# Patient Record
Sex: MALE | Race: WHITE | NOT HISPANIC OR LATINO | Employment: OTHER | URBAN - METROPOLITAN AREA
[De-identification: names, ages, dates, MRNs, and addresses within clinical notes are randomized per-mention and may not be internally consistent; named-entity substitution may affect disease eponyms.]

---

## 2017-09-01 ENCOUNTER — HOSPITAL ENCOUNTER (OUTPATIENT)
Facility: MEDICAL CENTER | Age: 82
End: 2017-09-01

## 2017-09-01 ENCOUNTER — RESOLUTE PROFESSIONAL BILLING HOSPITAL PROF FEE (OUTPATIENT)
Dept: HOSPITALIST | Facility: MEDICAL CENTER | Age: 82
End: 2017-09-01

## 2017-09-01 NOTE — PROGRESS NOTES
Patient is a direct admit from [facility], patient of Dr. Cruz's for renal failure.  Dr. Coreas is accepting the patient.  Telephone ADT order received and entered into epic on 9/1.  Held orders in epic to be released upon patients arrival to unit.

## 2017-09-02 ENCOUNTER — HOSPITAL ENCOUNTER (INPATIENT)
Facility: MEDICAL CENTER | Age: 82
LOS: 12 days | DRG: 682 | End: 2017-09-14
Attending: HOSPITALIST | Admitting: INTERNAL MEDICINE
Payer: MEDICARE

## 2017-09-02 ENCOUNTER — APPOINTMENT (OUTPATIENT)
Dept: RADIOLOGY | Facility: MEDICAL CENTER | Age: 82
DRG: 682 | End: 2017-09-02
Attending: INTERNAL MEDICINE
Payer: MEDICARE

## 2017-09-02 DIAGNOSIS — N17.9 ACUTE RENAL FAILURE, UNSPECIFIED ACUTE RENAL FAILURE TYPE (HCC): ICD-10-CM

## 2017-09-02 PROBLEM — J18.9 PNEUMONIA: Status: ACTIVE | Noted: 2017-09-02

## 2017-09-02 PROBLEM — L89.150 UNSTAGEABLE PRESSURE ULCER OF SACRAL REGION (HCC): Status: ACTIVE | Noted: 2017-09-02

## 2017-09-02 PROBLEM — R79.89 TROPONIN LEVEL ELEVATED: Status: ACTIVE | Noted: 2017-09-02

## 2017-09-02 PROBLEM — I10 HYPERTENSION: Status: ACTIVE | Noted: 2017-09-02

## 2017-09-02 PROBLEM — H91.90 HOH (HARD OF HEARING): Status: ACTIVE | Noted: 2017-09-02

## 2017-09-02 PROBLEM — R60.9 EDEMA: Status: ACTIVE | Noted: 2017-09-02

## 2017-09-02 PROBLEM — D64.9 ANEMIA: Status: ACTIVE | Noted: 2017-09-02

## 2017-09-02 PROBLEM — E83.51 HYPOCALCEMIA: Status: ACTIVE | Noted: 2017-09-02

## 2017-09-02 PROBLEM — S37.009A KIDNEY INJURY: Status: ACTIVE | Noted: 2017-09-02

## 2017-09-02 PROBLEM — R33.9 URINARY RETENTION: Status: ACTIVE | Noted: 2017-09-02

## 2017-09-02 PROBLEM — R05.9 COUGH: Status: ACTIVE | Noted: 2017-09-02

## 2017-09-02 PROBLEM — R79.89 ELEVATED BRAIN NATRIURETIC PEPTIDE (BNP) LEVEL: Status: ACTIVE | Noted: 2017-09-02

## 2017-09-02 PROBLEM — E43 SEVERE PROTEIN-CALORIE MALNUTRITION (HCC): Status: ACTIVE | Noted: 2017-09-02

## 2017-09-02 LAB
ALBUMIN SERPL BCP-MCNC: 1.6 G/DL (ref 3.2–4.9)
ALBUMIN/GLOB SERPL: 0.5 G/DL
ALP SERPL-CCNC: 162 U/L (ref 30–99)
ALT SERPL-CCNC: 14 U/L (ref 2–50)
ANION GAP SERPL CALC-SCNC: 15 MMOL/L (ref 0–11.9)
APPEARANCE UR: ABNORMAL
AST SERPL-CCNC: 22 U/L (ref 12–45)
BACTERIA #/AREA URNS HPF: ABNORMAL /HPF
BASOPHILS # BLD AUTO: 0.2 % (ref 0–1.8)
BASOPHILS # BLD: 0.01 K/UL (ref 0–0.12)
BILIRUB SERPL-MCNC: 0.4 MG/DL (ref 0.1–1.5)
BILIRUB UR QL STRIP.AUTO: NEGATIVE
BNP SERPL-MCNC: 2871 PG/ML (ref 0–100)
BNP SERPL-MCNC: 2882 PG/ML (ref 0–100)
BUN SERPL-MCNC: 130 MG/DL (ref 8–22)
CALCIUM SERPL-MCNC: 6.3 MG/DL (ref 8.5–10.5)
CHLORIDE SERPL-SCNC: 110 MMOL/L (ref 96–112)
CHLORIDE UR-SCNC: 111 MMOL/L
CO2 SERPL-SCNC: 12 MMOL/L (ref 20–33)
COLOR UR: YELLOW
CREAT SERPL-MCNC: 7.39 MG/DL (ref 0.5–1.4)
CREAT UR-MCNC: 37 MG/DL
EOSINOPHIL # BLD AUTO: 0.02 K/UL (ref 0–0.51)
EOSINOPHIL NFR BLD: 0.3 % (ref 0–6.9)
EPI CELLS #/AREA URNS HPF: NEGATIVE /HPF
ERYTHROCYTE [DISTWIDTH] IN BLOOD BY AUTOMATED COUNT: 50.7 FL (ref 35.9–50)
FERRITIN SERPL-MCNC: 1105.6 NG/ML (ref 22–322)
FOLATE SERPL-MCNC: 6.6 NG/ML
GFR SERPL CREATININE-BSD FRML MDRD: 7 ML/MIN/1.73 M 2
GLOBULIN SER CALC-MCNC: 3.3 G/DL (ref 1.9–3.5)
GLUCOSE SERPL-MCNC: 93 MG/DL (ref 65–99)
GLUCOSE UR STRIP.AUTO-MCNC: NEGATIVE MG/DL
HCT VFR BLD AUTO: 31 % (ref 42–52)
HGB BLD-MCNC: 10.2 G/DL (ref 14–18)
HGB RETIC QN AUTO: 34.9 PG/CELL (ref 29–35)
HYALINE CASTS #/AREA URNS LPF: ABNORMAL /LPF
IMM GRANULOCYTES # BLD AUTO: 0.04 K/UL (ref 0–0.11)
IMM GRANULOCYTES NFR BLD AUTO: 0.7 % (ref 0–0.9)
IMM RETICS NFR: 9.2 % (ref 9.3–17.4)
IRON SATN MFR SERPL: 26 % (ref 15–55)
IRON SERPL-MCNC: 34 UG/DL (ref 50–180)
KETONES UR STRIP.AUTO-MCNC: NEGATIVE MG/DL
LEUKOCYTE ESTERASE UR QL STRIP.AUTO: ABNORMAL
LV EJECT FRACT  99904: 55
LV EJECT FRACT MOD 2C 99903: 50.28
LV EJECT FRACT MOD 4C 99902: 61.28
LV EJECT FRACT MOD BP 99901: 57.43
LYMPHOCYTES # BLD AUTO: 0.57 K/UL (ref 1–4.8)
LYMPHOCYTES NFR BLD: 9.3 % (ref 22–41)
MCH RBC QN AUTO: 29.2 PG (ref 27–33)
MCHC RBC AUTO-ENTMCNC: 32.9 G/DL (ref 33.7–35.3)
MCV RBC AUTO: 88.8 FL (ref 81.4–97.8)
MICRO URNS: ABNORMAL
MONOCYTES # BLD AUTO: 0.55 K/UL (ref 0–0.85)
MONOCYTES NFR BLD AUTO: 9 % (ref 0–13.4)
NEUTROPHILS # BLD AUTO: 4.91 K/UL (ref 1.82–7.42)
NEUTROPHILS NFR BLD: 80.5 % (ref 44–72)
NITRITE UR QL STRIP.AUTO: NEGATIVE
NRBC # BLD AUTO: 0 K/UL
NRBC BLD AUTO-RTO: 0 /100 WBC
PH UR STRIP.AUTO: 5 [PH]
PLATELET # BLD AUTO: 189 K/UL (ref 164–446)
PMV BLD AUTO: 11.8 FL (ref 9–12.9)
POTASSIUM SERPL-SCNC: 4.6 MMOL/L (ref 3.6–5.5)
POTASSIUM UR-SCNC: 25.6 MMOL/L
PROCALCITONIN SERPL-MCNC: 0.65 NG/ML
PROT SERPL-MCNC: 4.9 G/DL (ref 6–8.2)
PROT UR QL STRIP: 30 MG/DL
PROT UR-MCNC: 66.6 MG/DL (ref 0–15)
RBC # BLD AUTO: 3.49 M/UL (ref 4.7–6.1)
RBC # URNS HPF: ABNORMAL /HPF
RBC UR QL AUTO: ABNORMAL
RETICS # AUTO: 0.02 M/UL (ref 0.04–0.06)
RETICS/RBC NFR: 0.6 % (ref 0.8–2.1)
SODIUM SERPL-SCNC: 137 MMOL/L (ref 135–145)
SODIUM UR-SCNC: 98 MMOL/L
SP GR UR STRIP.AUTO: 1.01
TIBC SERPL-MCNC: 133 UG/DL (ref 250–450)
TROPONIN I SERPL-MCNC: 0.2 NG/ML (ref 0–0.04)
UROBILINOGEN UR STRIP.AUTO-MCNC: 0.2 MG/DL
VIT B12 SERPL-MCNC: 721 PG/ML (ref 211–911)
WBC # BLD AUTO: 6.1 K/UL (ref 4.8–10.8)
WBC #/AREA URNS HPF: ABNORMAL /HPF

## 2017-09-02 PROCEDURE — 84156 ASSAY OF PROTEIN URINE: CPT

## 2017-09-02 PROCEDURE — 84484 ASSAY OF TROPONIN QUANT: CPT

## 2017-09-02 PROCEDURE — 83540 ASSAY OF IRON: CPT

## 2017-09-02 PROCEDURE — 99221 1ST HOSP IP/OBS SF/LOW 40: CPT | Performed by: INTERNAL MEDICINE

## 2017-09-02 PROCEDURE — 80053 COMPREHEN METABOLIC PANEL: CPT

## 2017-09-02 PROCEDURE — 82570 ASSAY OF URINE CREATININE: CPT

## 2017-09-02 PROCEDURE — 82728 ASSAY OF FERRITIN: CPT

## 2017-09-02 PROCEDURE — 36415 COLL VENOUS BLD VENIPUNCTURE: CPT

## 2017-09-02 PROCEDURE — 76775 US EXAM ABDO BACK WALL LIM: CPT

## 2017-09-02 PROCEDURE — 85025 COMPLETE CBC W/AUTO DIFF WBC: CPT

## 2017-09-02 PROCEDURE — 85046 RETICYTE/HGB CONCENTRATE: CPT

## 2017-09-02 PROCEDURE — 302112 WASHCLOTH,PERINEAL CARE: Performed by: HOSPITALIST

## 2017-09-02 PROCEDURE — 700111 HCHG RX REV CODE 636 W/ 250 OVERRIDE (IP): Performed by: INTERNAL MEDICINE

## 2017-09-02 PROCEDURE — 82607 VITAMIN B-12: CPT

## 2017-09-02 PROCEDURE — 90471 IMMUNIZATION ADMIN: CPT

## 2017-09-02 PROCEDURE — 700101 HCHG RX REV CODE 250: Performed by: INTERNAL MEDICINE

## 2017-09-02 PROCEDURE — 700105 HCHG RX REV CODE 258: Performed by: INTERNAL MEDICINE

## 2017-09-02 PROCEDURE — 84300 ASSAY OF URINE SODIUM: CPT

## 2017-09-02 PROCEDURE — 84145 PROCALCITONIN (PCT): CPT

## 2017-09-02 PROCEDURE — 700111 HCHG RX REV CODE 636 W/ 250 OVERRIDE (IP): Performed by: HOSPITALIST

## 2017-09-02 PROCEDURE — 81001 URINALYSIS AUTO W/SCOPE: CPT

## 2017-09-02 PROCEDURE — 770020 HCHG ROOM/CARE - TELE (206)

## 2017-09-02 PROCEDURE — 93306 TTE W/DOPPLER COMPLETE: CPT

## 2017-09-02 PROCEDURE — 51798 US URINE CAPACITY MEASURE: CPT

## 2017-09-02 PROCEDURE — 90662 IIV NO PRSV INCREASED AG IM: CPT | Performed by: HOSPITALIST

## 2017-09-02 PROCEDURE — 3E0234Z INTRODUCTION OF SERUM, TOXOID AND VACCINE INTO MUSCLE, PERCUTANEOUS APPROACH: ICD-10-PCS | Performed by: HOSPITALIST

## 2017-09-02 PROCEDURE — 82746 ASSAY OF FOLIC ACID SERUM: CPT

## 2017-09-02 PROCEDURE — 93306 TTE W/DOPPLER COMPLETE: CPT | Mod: 26 | Performed by: INTERNAL MEDICINE

## 2017-09-02 PROCEDURE — 71010 DX-CHEST-PORTABLE (1 VIEW): CPT

## 2017-09-02 PROCEDURE — 84133 ASSAY OF URINE POTASSIUM: CPT

## 2017-09-02 PROCEDURE — 83880 ASSAY OF NATRIURETIC PEPTIDE: CPT

## 2017-09-02 PROCEDURE — 302255 BARRIER CREAM MOISTURE BAZA PROTECT (ZINC) 5OZ: Performed by: HOSPITALIST

## 2017-09-02 PROCEDURE — 82436 ASSAY OF URINE CHLORIDE: CPT

## 2017-09-02 PROCEDURE — 83550 IRON BINDING TEST: CPT

## 2017-09-02 PROCEDURE — 71250 CT THORAX DX C-: CPT

## 2017-09-02 RX ORDER — LABETALOL HYDROCHLORIDE 5 MG/ML
10 INJECTION, SOLUTION INTRAVENOUS EVERY 4 HOURS PRN
Status: DISCONTINUED | OUTPATIENT
Start: 2017-09-02 | End: 2017-09-04

## 2017-09-02 RX ORDER — BISACODYL 10 MG
10 SUPPOSITORY, RECTAL RECTAL
Status: DISCONTINUED | OUTPATIENT
Start: 2017-09-02 | End: 2017-09-04

## 2017-09-02 RX ORDER — ATENOLOL 50 MG/1
50 TABLET ORAL DAILY
Status: ON HOLD | COMMUNITY
End: 2017-09-13

## 2017-09-02 RX ORDER — ACETAMINOPHEN 325 MG/1
650 TABLET ORAL EVERY 6 HOURS PRN
Status: DISCONTINUED | OUTPATIENT
Start: 2017-09-02 | End: 2017-09-14 | Stop reason: HOSPADM

## 2017-09-02 RX ORDER — POLYETHYLENE GLYCOL 3350 17 G/17G
1 POWDER, FOR SOLUTION ORAL
Status: DISCONTINUED | OUTPATIENT
Start: 2017-09-02 | End: 2017-09-04

## 2017-09-02 RX ORDER — FUROSEMIDE 10 MG/ML
80 INJECTION INTRAMUSCULAR; INTRAVENOUS
Status: DISCONTINUED | OUTPATIENT
Start: 2017-09-02 | End: 2017-09-04

## 2017-09-02 RX ORDER — AMOXICILLIN 250 MG
2 CAPSULE ORAL 2 TIMES DAILY
Status: DISCONTINUED | OUTPATIENT
Start: 2017-09-02 | End: 2017-09-04

## 2017-09-02 RX ORDER — HEPARIN SODIUM 5000 [USP'U]/ML
5000 INJECTION, SOLUTION INTRAVENOUS; SUBCUTANEOUS EVERY 8 HOURS
Status: DISCONTINUED | OUTPATIENT
Start: 2017-09-02 | End: 2017-09-04

## 2017-09-02 RX ORDER — AMLODIPINE BESYLATE 5 MG/1
5 TABLET ORAL DAILY
Status: ON HOLD | COMMUNITY
End: 2017-09-13

## 2017-09-02 RX ORDER — ONDANSETRON 2 MG/ML
4 INJECTION INTRAMUSCULAR; INTRAVENOUS EVERY 4 HOURS PRN
Status: DISCONTINUED | OUTPATIENT
Start: 2017-09-02 | End: 2017-09-14 | Stop reason: HOSPADM

## 2017-09-02 RX ORDER — ONDANSETRON 4 MG/1
4 TABLET, ORALLY DISINTEGRATING ORAL EVERY 4 HOURS PRN
Status: DISCONTINUED | OUTPATIENT
Start: 2017-09-02 | End: 2017-09-14 | Stop reason: HOSPADM

## 2017-09-02 RX ORDER — AMLODIPINE BESYLATE 5 MG/1
5 TABLET ORAL DAILY
Status: DISCONTINUED | OUTPATIENT
Start: 2017-09-02 | End: 2017-09-04

## 2017-09-02 RX ORDER — ATENOLOL 50 MG/1
50 TABLET ORAL DAILY
Status: DISCONTINUED | OUTPATIENT
Start: 2017-09-02 | End: 2017-09-04

## 2017-09-02 RX ORDER — TAMSULOSIN HYDROCHLORIDE 0.4 MG/1
0.4 CAPSULE ORAL
Status: DISCONTINUED | OUTPATIENT
Start: 2017-09-02 | End: 2017-09-04

## 2017-09-02 RX ORDER — FUROSEMIDE 10 MG/ML
20 INJECTION INTRAMUSCULAR; INTRAVENOUS
Status: DISCONTINUED | OUTPATIENT
Start: 2017-09-02 | End: 2017-09-02

## 2017-09-02 RX ADMIN — FUROSEMIDE 80 MG: 10 INJECTION, SOLUTION INTRAMUSCULAR; INTRAVENOUS at 14:20

## 2017-09-02 RX ADMIN — AMPICILLIN SODIUM AND SULBACTAM SODIUM 3 G: 2; 1 INJECTION, POWDER, FOR SOLUTION INTRAMUSCULAR; INTRAVENOUS at 11:43

## 2017-09-02 RX ADMIN — INFLUENZA A VIRUSA/MICHIGAN/45/2015 X-275 (H1N1) ANTIGEN (FORMALDEHYDE INACTIVATED), INFLUENZA A VIRUS A/HONG KONG/4801/2014 X-263B (H3N2) ANTIGEN (FORMALDEHYDE INACTIVATED), AND INFLUENZA B VIRUS B/BRISBANE/60/2008 ANTIGEN (FORMALDEHYDE INACTIVATED) 0.5 ML: 60; 60; 60 INJECTION, SUSPENSION INTRAMUSCULAR at 05:42

## 2017-09-02 RX ADMIN — HEPARIN SODIUM 5000 UNITS: 5000 INJECTION, SOLUTION INTRAVENOUS; SUBCUTANEOUS at 22:06

## 2017-09-02 RX ADMIN — DOXYCYCLINE 100 MG: 100 INJECTION, POWDER, LYOPHILIZED, FOR SOLUTION INTRAVENOUS at 22:06

## 2017-09-02 RX ADMIN — DOXYCYCLINE 100 MG: 100 INJECTION, POWDER, LYOPHILIZED, FOR SOLUTION INTRAVENOUS at 12:34

## 2017-09-02 RX ADMIN — HEPARIN SODIUM 5000 UNITS: 5000 INJECTION, SOLUTION INTRAVENOUS; SUBCUTANEOUS at 14:16

## 2017-09-02 RX ADMIN — HEPARIN SODIUM 5000 UNITS: 5000 INJECTION, SOLUTION INTRAVENOUS; SUBCUTANEOUS at 05:42

## 2017-09-02 ASSESSMENT — PATIENT HEALTH QUESTIONNAIRE - PHQ9
7. TROUBLE CONCENTRATING ON THINGS, SUCH AS READING THE NEWSPAPER OR WATCHING TELEVISION: NOT AT ALL
SUM OF ALL RESPONSES TO PHQ QUESTIONS 1-9: 1
1. LITTLE INTEREST OR PLEASURE IN DOING THINGS: NOT AT ALL
SUM OF ALL RESPONSES TO PHQ9 QUESTIONS 1 AND 2: 1
4. FEELING TIRED OR HAVING LITTLE ENERGY: NOT AT ALL
6. FEELING BAD ABOUT YOURSELF - OR THAT YOU ARE A FAILURE OR HAVE LET YOURSELF OR YOUR FAMILY DOWN: NOT AL ALL
8. MOVING OR SPEAKING SO SLOWLY THAT OTHER PEOPLE COULD HAVE NOTICED. OR THE OPPOSITE, BEING SO FIGETY OR RESTLESS THAT YOU HAVE BEEN MOVING AROUND A LOT MORE THAN USUAL: NOT AT ALL
2. FEELING DOWN, DEPRESSED, IRRITABLE, OR HOPELESS: SEVERAL DAYS
5. POOR APPETITE OR OVEREATING: NOT AT ALL
3. TROUBLE FALLING OR STAYING ASLEEP OR SLEEPING TOO MUCH: NOT AT ALL
9. THOUGHTS THAT YOU WOULD BE BETTER OFF DEAD, OR OF HURTING YOURSELF: NOT AT ALL

## 2017-09-02 ASSESSMENT — COPD QUESTIONNAIRES
DURING THE PAST 4 WEEKS HOW MUCH DID YOU FEEL SHORT OF BREATH: NONE/LITTLE OF THE TIME
DO YOU EVER COUGH UP ANY MUCUS OR PHLEGM?: NO/ONLY WITH OCCASIONAL COLDS OR INFECTIONS
COPD SCREENING SCORE: 2
HAVE YOU SMOKED AT LEAST 100 CIGARETTES IN YOUR ENTIRE LIFE: NO/DON'T KNOW

## 2017-09-02 ASSESSMENT — PAIN SCALES - GENERAL
PAINLEVEL_OUTOF10: 0

## 2017-09-02 ASSESSMENT — LIFESTYLE VARIABLES
EVER_SMOKED: NEVER
EVER FELT BAD OR GUILTY ABOUT YOUR DRINKING: NO
TOTAL SCORE: 0
EVER_SMOKED: NEVER
ON A TYPICAL DAY WHEN YOU DRINK ALCOHOL HOW MANY DRINKS DO YOU HAVE: 1
EVER HAD A DRINK FIRST THING IN THE MORNING TO STEADY YOUR NERVES TO GET RID OF A HANGOVER: NO
TOTAL SCORE: 0
CONSUMPTION TOTAL: NEGATIVE
HAVE PEOPLE ANNOYED YOU BY CRITICIZING YOUR DRINKING: NO
AVERAGE NUMBER OF DAYS PER WEEK YOU HAVE A DRINK CONTAINING ALCOHOL: 6
TOTAL SCORE: 0
HOW MANY TIMES IN THE PAST YEAR HAVE YOU HAD 5 OR MORE DRINKS IN A DAY: 0
HAVE YOU EVER FELT YOU SHOULD CUT DOWN ON YOUR DRINKING: NO
ALCOHOL_USE: YES

## 2017-09-02 ASSESSMENT — ENCOUNTER SYMPTOMS
ABDOMINAL PAIN: 0
SPEECH CHANGE: 0
HEMOPTYSIS: 0
CHILLS: 0
CONSTIPATION: 0
SENSORY CHANGE: 0
DIARRHEA: 0
FEVER: 0
BLURRED VISION: 0
HEADACHES: 0
WEAKNESS: 0
SPUTUM PRODUCTION: 0
VOMITING: 0
HEARTBURN: 0
MYALGIAS: 0
INSOMNIA: 0
PHOTOPHOBIA: 0
COUGH: 1
SHORTNESS OF BREATH: 0
NERVOUS/ANXIOUS: 0
DEPRESSION: 0
CLAUDICATION: 0
DIZZINESS: 0

## 2017-09-02 ASSESSMENT — COGNITIVE AND FUNCTIONAL STATUS - GENERAL
WALKING IN HOSPITAL ROOM: A LOT
MOVING TO AND FROM BED TO CHAIR: A LOT
EATING MEALS: A LITTLE
MOBILITY SCORE: 12
DRESSING REGULAR LOWER BODY CLOTHING: A LOT
HELP NEEDED FOR BATHING: A LOT
DRESSING REGULAR UPPER BODY CLOTHING: A LOT
TOILETING: A LOT
MOVING FROM LYING ON BACK TO SITTING ON SIDE OF FLAT BED: A LOT
PERSONAL GROOMING: A LOT
DAILY ACTIVITIY SCORE: 13
TURNING FROM BACK TO SIDE WHILE IN FLAT BAD: A LITTLE
SUGGESTED CMS G CODE MODIFIER DAILY ACTIVITY: CL
SUGGESTED CMS G CODE MODIFIER MOBILITY: CL
STANDING UP FROM CHAIR USING ARMS: A LOT
CLIMB 3 TO 5 STEPS WITH RAILING: TOTAL

## 2017-09-02 NOTE — PROGRESS NOTES
Pt in bed sleeping. Pt has audible wheezes. No signs pain or distress at this time. Call light in place and bed in lowest position.

## 2017-09-02 NOTE — PROGRESS NOTES
Notified by monitor room patient had three beats idoventricular. MD notified. MD at bedside to assess patient.

## 2017-09-02 NOTE — PROGRESS NOTES
Pt arrived to unit via gurney at 0100 from care flight. Pt oriented to room, unit, and plan of care. Tele-monitor placed and monitor room notified. All questions answered at this time. Call light within reach; fall precautions in place.

## 2017-09-02 NOTE — ASSESSMENT & PLAN NOTE
- due to anemia of renal disease/chronic disease with low iron and high ferritin.   - monitor Hgb daily, transfuse for Hgb<7.

## 2017-09-02 NOTE — ASSESSMENT & PLAN NOTE
- well controlled.   - continue norvasc and atenolol. Continue to monitor BP. Continue PRN IV labetalol for significant HTN.

## 2017-09-02 NOTE — PROGRESS NOTES
Pt in bed resting. No signs of pain or distress noted. Urine sample collected. Urine pale and clear with no particles. Call light in place and bed in lowest position

## 2017-09-02 NOTE — ASSESSMENT & PLAN NOTE
- continue unasyn, and doxycycline. Continue to trend procalcitonin. Send for sputum cultures if with phlegm.  - maintain NPO. Insert cortrak, and start tube feeding per SLP.

## 2017-09-02 NOTE — CARE PLAN
Problem: Safety  Goal: Will remain free from falls    Intervention: Assess risk factors for falls  Fall precautions in place. Bed in lowest position. Non-skid socks in place. Personal possessions within reach. Mobility sign on door. Bed-alarm on. Call light within reach. Pt educated regarding fall prevention and states understanding.       Problem: Knowledge Deficit  Goal: Knowledge of disease process/condition, treatment plan, diagnostic tests, and medications will improve    Intervention: Assess knowledge level of disease process/condition, treatment plan, diagnostic tests, and medications  Pt educated regarding plan of care and medications. All questions answered.

## 2017-09-02 NOTE — PROGRESS NOTES
Savita Bo Fall Risk Assessment:     Last Known Fall: Within the last six months  Mobility: Use of assistive device/requires assist of two people  Medications: Cardiovascular or central nervous system meds  Mental Status/LOC/Awareness: Awake, alert, and oriented to date, place, and person  Toileting Needs: Use of catheters or diversion devices  Volume/Electrolyte Status: No problems  Communication/Sensory: Visual (Glasses)/hearing deficit  Behavior: Appropriate behavior  Savita Bo Fall Risk Total: 11  Fall Risk Level: MODERATE RISK    Universal Fall Precautions:  call light/belongings in reach, bed in low position and locked, wheelchairs and assistive devices out of sight, siderails up x 2, use non-slip footwear, adequate lighting, clutter free and spill free environment, educate to call for assistance, educate on level of risk    Fall Risk Level Interventions:    TRIAL (TELE 8, NEURO, MED MARIMAR 5) Moderate Fall Risk Interventions  Place yellow fall risk ID band on patient: completed  Provide patient/family education based on risk assessment : completed  Educate patient/family to call staff for assistance when getting out of bed: completed  Place fall precaution signage outside patient door: completed  Utilize bed/chair fall alarm: completed     Patient Specific Interventions:     Medication: limit combination of prn medications  Mental Status/LOC/Awareness: reinforce falls education, check on patient hourly, utilize bed/chair fall alarm and reinforce the use of call light  Toileting: provide frquent toileting and instruct patient/family on the need to call for assistance when toileting  Volume/Electrolyte Status: monitor abnormal lab values  Communication/Sensory: update plan of care on whiteboard, ensure proper positioning when transferrng/ambulating and ensure patient has glasses/contacts and hearing aids/dentures  Behavioral: encourage patient to voice feelings, administer medication as ordered and  instruct/reinforce fall program rationale  Mobility: provide comfort measures during transport, utilize bed/chair fall alarm, ensure bed is locked and in lowest position and provide appropriate assistive device

## 2017-09-02 NOTE — H&P
Hospital Medicine History and Physical    Date of Service  9/2/2017    Chief Complaint  No chief complaint on file.      History of Presenting Illness  89 y.o. male who presented 9/2/2017 as a transfer from [facility] with continued elevation in his Cr despite interventions with IVF.  He has been producing urine but with infrequency.  His presenting complaint to outside facility was of BLE edema.  He was given IV hydration and cr went from 7.29 to 6.0 but he developed respiratory failure and worsening chronic cough and then was diuresed.  CR up to 6.75 today which prompted transfer for higher level of care.  Patient states he does feel better and the leg swelling he had has since resolved and MITZI hose are in place.  He also has un-stagable ulcer on the sacrum on presentation.  Patient states he has a chronic cough but no other complaints.  Will order CXR, Echo, Renal US for update on status.    Primary Care Physician  No primary care provider on file.    Consultants  None currently  Consider nephrology consultation    Code Status  DNR    Review of Systems  Review of Systems   Constitutional: Negative for chills and fever.   HENT: Negative for congestion.    Eyes: Negative for blurred vision and photophobia.   Respiratory: Positive for cough (chronic). Negative for hemoptysis, sputum production and shortness of breath.    Cardiovascular: Negative for chest pain, claudication and leg swelling.   Gastrointestinal: Negative for abdominal pain, constipation, diarrhea, heartburn and vomiting.   Genitourinary: Negative for dysuria and hematuria.   Musculoskeletal: Negative for joint pain and myalgias.   Skin: Negative for itching and rash.   Neurological: Negative for dizziness, sensory change, speech change, weakness and headaches.   Psychiatric/Behavioral: Negative for depression. The patient is not nervous/anxious and does not have insomnia.         Past Medical History  No past medical history on  file.  Hypertension    Surgical History  No past surgical history on file.  none  Medications  No current facility-administered medications on file prior to encounter.      No current outpatient prescriptions on file prior to encounter.   atenolol 50 mg qday  Amlodipine 5mg qday    Family History  No family history on file.    Social History  Social History   Substance Use Topics   • Smoking status: Not on file   • Smokeless tobacco: Not on file   • Alcohol use Not on file       Allergies  Allergies no known allergies     Physical Exam  Laboratory   Hemodynamics  Temp (24hrs), Av.5 °C (97.7 °F), Min:36.5 °C (97.7 °F), Max:36.5 °C (97.7 °F)   Temperature: 36.5 °C (97.7 °F)  Pulse  Av  Min: 76  Max: 76    Blood Pressure : 112/63      Respiratory      Respiration: 18, Pulse Oximetry: 93 %             Physical Exam   Constitutional: He is oriented to person, place, and time. He appears well-developed and well-nourished. No distress.   HENT:   Head: Normocephalic and atraumatic.   Eyes: Conjunctivae are normal. No scleral icterus.   Neck: Neck supple. No JVD present.   Cardiovascular: Normal rate and regular rhythm.  Exam reveals no gallop and no friction rub.    No murmur heard.  Pulmonary/Chest: Effort normal and breath sounds normal. No respiratory distress. He has no wheezes. He exhibits no tenderness.   Abdominal: Soft. Bowel sounds are normal. He exhibits no distension and no mass. There is no tenderness.   Musculoskeletal: He exhibits no edema or tenderness.   Neurological: He is alert and oriented to person, place, and time. No cranial nerve deficit.   Skin: Skin is warm and dry. He is not diaphoretic. No erythema. No pallor.   Un-stagable pressure ulcer sacrum     Psychiatric: He has a normal mood and affect. His behavior is normal.   Nursing note and vitals reviewed.              No results for input(s): ALTSGPT, ASTSGOT, ALKPHOSPHAT, TBILIRUBIN, DBILIRUBIN, GAMMAGT, AMYLASE, LIPASE, ALB, PREALBUMIN,  "GLUCOSE in the last 72 hours.              No results found for: TROPONINI  Urinalysis:  No results found for: SPECGRAVITY, GLUCOSEUR, KETONES, NITRITE, WBCURINE, RBCURINE, BACTERIA, EPITHELCELL     Imaging  cxr from outside facility 9/1/17:  Bilateral alveolar process present     Assessment/Plan     I anticipate this patient will require at least two midnights for appropriate medical management, necessitating inpatient admission.    Troponin level elevated   Assessment & Plan    Troponin documented 0.231 on admission at outside hospital - likely related to renal failure rather than true cardiac ischemia  Recheck level          Cough   Assessment & Plan    Patient states chronic issue  CXR pending - \"bilateral alveolar process present\" documented on xr report from this am          Edema   Assessment & Plan    Patient's complaint on admission to outside facility   MITZI hose in place from transfer, no presence of LE edema now          Hypertension   Assessment & Plan    Was on amlodipine/atenolol prior to admission  Patient denied recent f/u with physicians.          Elevated brain natriuretic peptide (BNP) level   Assessment & Plan    Clinically did not present with chf  Level documented at 4581 (not considered elevated unless >900 based on outlying facility scale)  No EF or echo in records sent from outside facility  Check Echo  Continue with tele monitoring          Kidney injury   Assessment & Plan    Presented to outside facility with cr 6.75 up to 7.29, down to 6.0 and then back up again today  Renal US from 7/2017 unremarkable other than a cyst on right kidney  He was hydrated at outlying facility and developed respiratory failure following this he did respond to diuresis and is in on room air  Consider nephrology consult in am  Renal US and FeNa ordered              VTE prophylaxis: heparin.       "

## 2017-09-02 NOTE — PROGRESS NOTES
2 RN skin check completed with Jv HORTON. Elbows and heels red, blanching. Sacrum with moisture fissure above gluteal cleft and non-blanching area inferior to the coccyx on left buttock. Small scabs on legs, b/l.  No other skin breakdown noted.

## 2017-09-02 NOTE — ASSESSMENT & PLAN NOTE
Patient's complaint on admission to outside facility   MITZI hose in place from transfer, no presence of LE edema now

## 2017-09-02 NOTE — ASSESSMENT & PLAN NOTE
- likely demand ischemia from ALBERTO. No CP or symptoms. No regional hypokinesia on echo.   - continue to monitor on telemetry.

## 2017-09-02 NOTE — PROGRESS NOTES
Pt in chair. No signs of pain or distress noted at this time. Call light in place and bed in lowest position. Will continue to monitor

## 2017-09-02 NOTE — PROGRESS NOTES
Received report from nightshift RN, assumed care of patient. Patient is A&O x 3, disoriented to date, bed alarm on. Patient educated on importance of calling if in need of assistance. Verbalizes understanding. Patient declines pain at this time. Patient updated on plan of care, voices no concerns at this time. Will continue to monitor for safety and comfort.

## 2017-09-02 NOTE — ASSESSMENT & PLAN NOTE
"Patient states chronic issue  CXR pending - \"bilateral alveolar process present\" documented on xr report from this am    "

## 2017-09-02 NOTE — ASSESSMENT & PLAN NOTE
- unclear cause, suspect CKD/ESRD. No hydronephrosis on US.   - continue saavedra, monitor UO.   - continue IV lasix for now. Close UO monitoring.   - continue to avoid nephrotoxins. Renally dose medications.   - Nephrology following. May need dialysis eventually but pt lives in an area without outpatient dialysis center.

## 2017-09-02 NOTE — PROGRESS NOTES
[facility] Hospitalist Progress Note    Date of Service: 2017    Chief Complaint  89/M with HTN, transferred from [facility] for continued creatinine elevation (crea 7.29) despite IVF, causing respiratory failure and worsening of chronic cough, responding to diuretics. (+) unstageable sacral ulcer. Trop elevated at 0.23, felt to be due to ALBERTO. Has been retaining urine there and saavedra placed.      Interval Problem Update  2017 - no overnight events. Remains hemodynamically stable and afebrile. Stable on RA.  No leukocytosis. Hgb 10.2. Crea 7.39. Na, K WNL. Trop down to 0.20. BNP 2871. Albumin 1.6. Pending echo and renal US. CXR showed multifocal consolidation (personally reviewed).     > Seen and examined. Comfortable. Denied any CP, SOB, nausea, vomiting, abd pain. AOx3. (+) coughing with PO intake. States he lives alone.     Consultants/Specialty  Nephrology    Disposition  Monitor on telemetry. PT/OT.       ROS     Pertinent positives/negatives as mentioned above.     A complete review of systems was done. All other systems were negative.      Physical Exam  Laboratory/Imaging   Hemodynamics  Temp (24hrs), Av.4 °C (97.6 °F), Min:36.3 °C (97.4 °F), Max:36.5 °C (97.7 °F)   Temperature: 36.3 °C (97.4 °F)  Pulse  Av.7  Min: 70  Max: 76    Blood Pressure : 110/57      Respiratory      Respiration: 20, Pulse Oximetry: 93 %        RUL Breath Sounds: Clear, RML Breath Sounds: Diminished;Crackles, RLL Breath Sounds: Diminished, INGRIS Breath Sounds: Clear, LLL Breath Sounds: Diminished    Fluids    Intake/Output Summary (Last 24 hours) at 17 1232  Last data filed at 17 0600   Gross per 24 hour   Intake                0 ml   Output              300 ml   Net             -300 ml       Nutrition  Orders Placed This Encounter   Procedures   • DIET NPO     Standing Status:   Standing     Number of Occurrences:   1     Order Specific Question:   Restrict to:     Answer:   Strict [1]     Physical Exam    Constitutional: He is oriented to person, place, and time. He appears well-developed.   Hard of hearing. (+) malnourished   HENT:   Head: Normocephalic and atraumatic.   Mouth/Throat: Oropharynx is clear and moist. No oropharyngeal exudate.   Eyes: EOM are normal. Pupils are equal, round, and reactive to light. Right eye exhibits no discharge. Left eye exhibits no discharge. No scleral icterus.   Neck: Normal range of motion. Neck supple. No thyromegaly present.   Cardiovascular: Normal rate and regular rhythm.  Exam reveals no gallop and no friction rub.    No murmur heard.  Pulmonary/Chest: Effort normal.   (+) crackles B/L. Diminished air entry.    Abdominal: Soft. Bowel sounds are normal. There is no tenderness. There is no rebound and no guarding.   Musculoskeletal: Normal range of motion. He exhibits edema (1-2+ B/L LE). He exhibits no tenderness.   Lymphadenopathy:     He has no cervical adenopathy.   Neurological: He is alert and oriented to person, place, and time. No cranial nerve deficit. Coordination normal.   Skin: Skin is warm and dry. No rash noted. No erythema.   Psychiatric: He has a normal mood and affect. His behavior is normal. Thought content normal.   Vitals reviewed.      Recent Labs      09/02/17   0331   WBC  6.1   RBC  3.49*   HEMOGLOBIN  10.2*   HEMATOCRIT  31.0*   MCV  88.8   MCH  29.2   MCHC  32.9*   RDW  50.7*   PLATELETCT  189   MPV  11.8     Recent Labs      09/02/17   0331   SODIUM  137   POTASSIUM  4.6   CHLORIDE  110   CO2  12*   GLUCOSE  93   BUN  130*   CREATININE  7.39*   CALCIUM  6.3*         Recent Labs      09/02/17   0331   BNPBTYPENAT  2871*              Assessment/Plan     Acute renal failure (ARF) (CMS-[facility])- (present on admission)   Assessment & Plan    - unclear cause. No response to IVF at outside hospital. ?cardiorenal failure. ?postrenal due to urinary retention.   - check UA and urine electrolytes. Await renal US to r/o hydronephrosis.   - continue saavedra, monitor  UO.   - hold off on IVF as had fluid overload at other hospital. Trial of IV lasix 80mg BID.   - avoid nephrotoxins. Renally dose medications.   - I called Dr. Najjar of nephrology for further inputs.           Anemia- (present on admission)   Assessment & Plan    - check iron panel, folate, B12, retic count.   - monitor Hgb daily, transfuse for Hgb<7.        Urinary retention- (present on admission)   Assessment & Plan    - possibly contributing to renal failure.   - continue saavedra catheter. Check renal US for obstruction.   - start PO flomax.        Hypocalcemia, corrected to low albumin- (present on admission)   Assessment & Plan    - corrected calcium at 8.2.  - monitor.         Pneumonia, community acquires, possible aspiration pneumonia- (present on admission)   Assessment & Plan    - pt coughing with PO intake.   - start empiric unasyn, and doxycycline. Check procalcitonin and trend. Will get noncontrast chest CT to further evaluate. Repeat CXR in 48 hours.  - NPO for now. Will get swallow eval per SLP.         Troponin level elevated- (present on admission)   Assessment & Plan    - likely demand ischemia from ALBERTO. No CP or symptoms.   - continue to monitor on telemetry.   - await echo.           Elevated brain natriuretic peptide (BNP) level- (present on admission)   Assessment & Plan    - Only has trace/1+ LE edema.   - check echo to evaluate ventricular function. Hold off on IVF.   - start lasix 80mg IV BID.           Severe protein-calorie malnutrition, hypoalbuminemia (CMS-HCC)- (present on admission)   Assessment & Plan    - will get RD/nutrition inputs re: supplements.         Hypertension- (present on admission)   Assessment & Plan    - continue norvasc and atenolol. Continue to trend BP. Continue PRN IV labetalol for significant HTN.              Reviewed items::  Labs reviewed, Radiology images reviewed and Medications reviewed  Saavedra catheter::  No Saavedra  DVT prophylaxis pharmacological::   Heparin  Ulcer Prophylaxis::  Not indicated  Antibiotics:  Treating active infection/contamination beyond 24 hours perioperative coverage

## 2017-09-03 ENCOUNTER — APPOINTMENT (OUTPATIENT)
Dept: RADIOLOGY | Facility: MEDICAL CENTER | Age: 82
DRG: 682 | End: 2017-09-03
Attending: INTERNAL MEDICINE
Payer: MEDICARE

## 2017-09-03 PROBLEM — R13.10 DYSPHAGIA: Status: ACTIVE | Noted: 2017-09-03

## 2017-09-03 LAB
ANION GAP SERPL CALC-SCNC: 18 MMOL/L (ref 0–11.9)
BASOPHILS # BLD AUTO: 0.2 % (ref 0–1.8)
BASOPHILS # BLD: 0.01 K/UL (ref 0–0.12)
BNP SERPL-MCNC: 2805 PG/ML (ref 0–100)
BUN SERPL-MCNC: 147 MG/DL (ref 8–22)
CALCIUM SERPL-MCNC: 6.9 MG/DL (ref 8.5–10.5)
CHLORIDE SERPL-SCNC: 110 MMOL/L (ref 96–112)
CO2 SERPL-SCNC: 14 MMOL/L (ref 20–33)
CREAT SERPL-MCNC: 8.01 MG/DL (ref 0.5–1.4)
EOSINOPHIL # BLD AUTO: 0.05 K/UL (ref 0–0.51)
EOSINOPHIL NFR BLD: 0.8 % (ref 0–6.9)
ERYTHROCYTE [DISTWIDTH] IN BLOOD BY AUTOMATED COUNT: 49.1 FL (ref 35.9–50)
GFR SERPL CREATININE-BSD FRML MDRD: 6 ML/MIN/1.73 M 2
GLUCOSE SERPL-MCNC: 84 MG/DL (ref 65–99)
HCT VFR BLD AUTO: 32 % (ref 42–52)
HGB BLD-MCNC: 10.8 G/DL (ref 14–18)
IMM GRANULOCYTES # BLD AUTO: 0.03 K/UL (ref 0–0.11)
IMM GRANULOCYTES NFR BLD AUTO: 0.5 % (ref 0–0.9)
LYMPHOCYTES # BLD AUTO: 0.6 K/UL (ref 1–4.8)
LYMPHOCYTES NFR BLD: 9.6 % (ref 22–41)
MCH RBC QN AUTO: 29 PG (ref 27–33)
MCHC RBC AUTO-ENTMCNC: 33.8 G/DL (ref 33.7–35.3)
MCV RBC AUTO: 86 FL (ref 81.4–97.8)
MONOCYTES # BLD AUTO: 0.73 K/UL (ref 0–0.85)
MONOCYTES NFR BLD AUTO: 11.7 % (ref 0–13.4)
NEUTROPHILS # BLD AUTO: 4.84 K/UL (ref 1.82–7.42)
NEUTROPHILS NFR BLD: 77.2 % (ref 44–72)
NRBC # BLD AUTO: 0 K/UL
NRBC BLD AUTO-RTO: 0 /100 WBC
PLATELET # BLD AUTO: 244 K/UL (ref 164–446)
PMV BLD AUTO: 11.2 FL (ref 9–12.9)
POTASSIUM SERPL-SCNC: 4.7 MMOL/L (ref 3.6–5.5)
PROCALCITONIN SERPL-MCNC: 0.67 NG/ML
RBC # BLD AUTO: 3.72 M/UL (ref 4.7–6.1)
SODIUM SERPL-SCNC: 142 MMOL/L (ref 135–145)
WBC # BLD AUTO: 6.3 K/UL (ref 4.8–10.8)

## 2017-09-03 PROCEDURE — 36415 COLL VENOUS BLD VENIPUNCTURE: CPT

## 2017-09-03 PROCEDURE — 92610 EVALUATE SWALLOWING FUNCTION: CPT

## 2017-09-03 PROCEDURE — 83880 ASSAY OF NATRIURETIC PEPTIDE: CPT

## 2017-09-03 PROCEDURE — 700111 HCHG RX REV CODE 636 W/ 250 OVERRIDE (IP): Performed by: INTERNAL MEDICINE

## 2017-09-03 PROCEDURE — 80048 BASIC METABOLIC PNL TOTAL CA: CPT

## 2017-09-03 PROCEDURE — 99233 SBSQ HOSP IP/OBS HIGH 50: CPT | Performed by: INTERNAL MEDICINE

## 2017-09-03 PROCEDURE — 700101 HCHG RX REV CODE 250: Performed by: INTERNAL MEDICINE

## 2017-09-03 PROCEDURE — 85025 COMPLETE CBC W/AUTO DIFF WBC: CPT

## 2017-09-03 PROCEDURE — G8996 SWALLOW CURRENT STATUS: HCPCS | Mod: CL

## 2017-09-03 PROCEDURE — G8997 SWALLOW GOAL STATUS: HCPCS | Mod: CJ

## 2017-09-03 PROCEDURE — 700105 HCHG RX REV CODE 258: Performed by: INTERNAL MEDICINE

## 2017-09-03 PROCEDURE — A9270 NON-COVERED ITEM OR SERVICE: HCPCS | Performed by: INTERNAL MEDICINE

## 2017-09-03 PROCEDURE — 84145 PROCALCITONIN (PCT): CPT

## 2017-09-03 PROCEDURE — 700102 HCHG RX REV CODE 250 W/ 637 OVERRIDE(OP): Performed by: INTERNAL MEDICINE

## 2017-09-03 PROCEDURE — 770020 HCHG ROOM/CARE - TELE (206)

## 2017-09-03 RX ADMIN — HEPARIN SODIUM 5000 UNITS: 5000 INJECTION, SOLUTION INTRAVENOUS; SUBCUTANEOUS at 20:18

## 2017-09-03 RX ADMIN — STANDARDIZED SENNA CONCENTRATE AND DOCUSATE SODIUM 2 TABLET: 8.6; 5 TABLET, FILM COATED ORAL at 20:18

## 2017-09-03 RX ADMIN — AMLODIPINE BESYLATE 5 MG: 5 TABLET ORAL at 17:53

## 2017-09-03 RX ADMIN — FUROSEMIDE 80 MG: 10 INJECTION, SOLUTION INTRAMUSCULAR; INTRAVENOUS at 16:19

## 2017-09-03 RX ADMIN — DOXYCYCLINE 100 MG: 100 INJECTION, POWDER, LYOPHILIZED, FOR SOLUTION INTRAVENOUS at 20:18

## 2017-09-03 RX ADMIN — HEPARIN SODIUM 5000 UNITS: 5000 INJECTION, SOLUTION INTRAVENOUS; SUBCUTANEOUS at 14:29

## 2017-09-03 RX ADMIN — FUROSEMIDE 80 MG: 10 INJECTION, SOLUTION INTRAMUSCULAR; INTRAVENOUS at 05:55

## 2017-09-03 RX ADMIN — DOXYCYCLINE 100 MG: 100 INJECTION, POWDER, LYOPHILIZED, FOR SOLUTION INTRAVENOUS at 09:11

## 2017-09-03 RX ADMIN — ATENOLOL 50 MG: 50 TABLET ORAL at 17:54

## 2017-09-03 RX ADMIN — AMPICILLIN SODIUM AND SULBACTAM SODIUM 3 G: 2; 1 INJECTION, POWDER, FOR SOLUTION INTRAMUSCULAR; INTRAVENOUS at 09:11

## 2017-09-03 RX ADMIN — HEPARIN SODIUM 5000 UNITS: 5000 INJECTION, SOLUTION INTRAVENOUS; SUBCUTANEOUS at 05:56

## 2017-09-03 ASSESSMENT — COGNITIVE AND FUNCTIONAL STATUS - GENERAL
WALKING IN HOSPITAL ROOM: A LOT
STANDING UP FROM CHAIR USING ARMS: A LOT
PERSONAL GROOMING: A LOT
MOVING TO AND FROM BED TO CHAIR: A LOT
TURNING FROM BACK TO SIDE WHILE IN FLAT BAD: A LITTLE
DRESSING REGULAR LOWER BODY CLOTHING: A LOT
MOBILITY SCORE: 12
SUGGESTED CMS G CODE MODIFIER DAILY ACTIVITY: CL
SUGGESTED CMS G CODE MODIFIER MOBILITY: CL
MOVING FROM LYING ON BACK TO SITTING ON SIDE OF FLAT BED: A LOT
CLIMB 3 TO 5 STEPS WITH RAILING: TOTAL
EATING MEALS: A LITTLE
DAILY ACTIVITIY SCORE: 13
DRESSING REGULAR UPPER BODY CLOTHING: A LOT
HELP NEEDED FOR BATHING: A LOT
TOILETING: A LOT

## 2017-09-03 ASSESSMENT — ENCOUNTER SYMPTOMS
NAUSEA: 0
PALPITATIONS: 0
CHILLS: 0
VOMITING: 0
FEVER: 0

## 2017-09-03 ASSESSMENT — PAIN SCALES - GENERAL
PAINLEVEL_OUTOF10: 0

## 2017-09-03 NOTE — WOUND TEAM
"[facility]  Inpatient Services  Initial Wound and Skin Care Evaluation    Admission Date:  09/02/17     HPI, PMH, SH: Reviewed  Unit where seen by Wound Team:  T8    WOUND CONSULT RELATED TO:  Coccyx      SUBJECTIVE:  \"I had this wound before.\"        Self Report / Pain Level:  Tolerated well      OBJECTIVE:  In bed, Mepilex in place, overlay and pillows in place.    WOUND TYPE, LOCATION, CHARACTERISTICS (Pressure ulcers: location, stage, POA or date identified)    Location and type of wound:  Stage 2 pressure injury, coccyx, POA          Periwound:  Mild maceration, intact       Drainage:   Scant, SS    Tissue Type and %:  100% pink/red    Wound Edges:   Attached    Odor:     None   Exposed structure(s):  None   S&S of Infection:   None       Measurements:  (Taken 09/03/17)   Length:   2 cm    Width:    0.75 cm    Depth:    <0.5 cm     INTERVENTIONS BY WOUND TEAM:  Patient turned with CNA, peeled back Mepilex to assess area and reinforced Mepilex, patient repositioned for comfort.      Interdisciplinary consultation:  RN, patient      EVALUATION:  Patient with wound to coccyx, appears may have started as moisture fissure but due to location on bony prominence and width will treat as pressure injury.  Mepilex sacral for pressure redistribution and protection.     Factors affecting wound healing:  Anemia, immobility, intermittent confusion      Goals:  Decrease in wound size by 1% each week.    NURSING PLAN OF CARE ORDERS (X):    Dressing changes: See Dressing Maintenance orders: X  Skin care: See Skin Care orders: X  Rectal tube care: See Rectal Tube Care orders:   Other orders:    RSKIN: CURRENT (X) ORDERED (O)  Q shift Steven:  X  Q shift pressure point assessments:  X  Pressure redistribution mattress      X with overlay  SHYANN      Bariatric SHYANN      Bariatric foam        Heel float boots       Heels floated on pillows    X  Barrier wipes      Barrier Cream      Barrier paste      Sacral silicone " dressing    X  Silicone O2 tubing      Anchorfast      Trach with Optifoam split foam       Waffle cushion      Rectal tube or BMS      Antifungal tx    Turn q 2 hours   X  Up to chair     Ambulate   PT/OT     Dietician      PO     TF X  TPN     PVN    NPO   # days   Other       WOUND TEAM PLAN OF CARE (X):   NPWT change 3 x week:        Dressing changes by wound team:       Follow up as needed:     X  Other (explain):    Anticipated discharge plans (X):  SNF:         X  Home Care:           Outpatient Wound Center:            Self Care:            Other:

## 2017-09-03 NOTE — PROGRESS NOTES
Queta from Lab called with critical result of Calcium at 6.9. Critical lab result read back to Queta.   This critical lab result is within parameters established by  for this patient

## 2017-09-03 NOTE — CONSULTS
DATE OF SERVICE:  09/02/2017    REQUESTING PHYSICIAN:  Ernesto Flores MD    REASON FOR CONSULTATION:  Acute kidney injury.    The patient was seen and examined, medical records were reviewed.    HISTORY OF PRESENT ILLNESS:  The patient is a very pleasant 89-year-old   gentleman who presents from an outside hospital with acute kidney injury.    Unfortunately, I do not have his baseline creatinine.  Patient was admitted to   an outside hospital with bilateral lower extremity edema, also was found to   have elevated creatinine.  His creatinine did not improve.  The patient was   transferred to [facility] for further management.  The patient did   have a Gonzalez catheter placed and he is producing urine.    Patient has no chest pain, no shortness of breath.  No nausea, no vomiting, no   recent use of NSAIDs or IV contrast.    PAST MEDICAL HISTORY:  Significant for hypertension.    ALLERGIES:  No known drug allergies.    SOCIAL HISTORY:  Patient lives by himself.    FAMILY HISTORY:  No known renal disease.    MEDICATIONS:  Reviewed.    PHYSICAL EXAMINATION:  GENERAL:  Patient is in no apparent distress.  VITAL SIGNS:  Showed blood pressure of 112/63, heart rate was 72.  HEENT:  Normocephalic, atraumatic.  Sclerae is anicteric.  NECK:  No lymphadenopathy.  CHEST:  Normal.  LUNGS:  Clear to auscultation.  HEART:  S1, S2.  ABDOMEN:  Soft, nontender.  EXTREMITIES:  There is +1 edema.  SKIN:  No skin rashes.  NEUROLOGIC:  Patient is alert and oriented x3.    LABORATORY DATA:  His recent labs were reviewed.    ASSESSMENT AND PLAN:  1.  Acute kidney injury.  The etiology is probably chronic kidney disease.    The patient has end-stage renal disease.  2.  Metabolic acidosis.  3.  Anemia.  4.  Hypertension.    PLAN:  1.  There is no acute need for renal replacement therapy.  2.  Check urine sodium, creatinine as well as protein.  3.  Encourage oral intake.  4.  We will check labs daily.  If there is no improvement, we  would consider   oral option.  Unfortunately, the patient has very limited choice for him   because he [place] in a remote area and there is no dialysis close to their   house.  5.  Prognosis is guarded.    Plan was discussed in detail with Dr. Flores.       ____________________________________     FADI NAJJAR, MD FN / ESMER    DD:  09/02/2017 13:59:15  DT:  09/02/2017 17:10:00    D#:  [id number]  Job#:  [id number]

## 2017-09-03 NOTE — PROGRESS NOTES
Bedside report received. Pt awake I bed. He states he wants his pants put back on but he does not have any in room. States no pain at this time. Call light in place and bed in lowest position.

## 2017-09-03 NOTE — PROGRESS NOTES
Pt in bed sleeping. No pain or discomfort noted at this time. Call light in place and bed in lowest position. Will continue to monitor

## 2017-09-03 NOTE — THERAPY
"Speech Language Therapy Clinical Swallow Evaluation completed.    Functional Status:  Pt was AAOx2, with wheezing noted prior to PO intake.  PO trials included: ice chips, nectars, puree, soft solids and thins.  Pt presents with s/sx of severe oropharyngeal dysphagia with concern for aspiration with ALL PO trials.  Laryngeal elevation elevated as weak and he triggered multiple swallows with single boluses.  Pt had throat clearing, intermittent coughing and an increase in wet vocal quality with all PO trials.  Pt also noted to have oral residue following PO trials of soft solids, which places him at risk for aspiration on residue.  Recommend NPO/cortrak.  Pt was educated regarding SLP recommendations and POC, and verbally consented to feeding tube.      Recommendations - Diet:  NPO, Pre-Feeding Trials with SLP Only                          Strategies: to be determined                          Medication Administration: Via Gastric Tube    Plan of Care: Will benefit from Speech Therapy 3 times per week    Post-Acute Therapy: Discharge to a transitional care facility for continued skilled therapy services.    See \"Rehab Therapy-Acute\" Patient Summary Report for complete documentation. Thank you for the consult.    "

## 2017-09-03 NOTE — PROGRESS NOTES
Savita Bo Fall Risk Assessment:     Last Known Fall: Within the last six months  Mobility: Use of assistive device/requires assist of two people  Medications: Cardiovascular or central nervous system meds, Diuretics  Mental Status/LOC/Awareness: Awake, alert, and oriented to date, place, and person  Toileting Needs: Use of catheters or diversion devices  Volume/Electrolyte Status: NPO greater than 24 hours  Communication/Sensory: Visual (Glasses)/hearing deficit  Behavior: Appropriate behavior  Savita Bo Fall Risk Total: 15  Fall Risk Level: HIGH RISK    Universal Fall Precautions:  call light/belongings in reach, bed in low position and locked, wheelchairs and assistive devices out of sight, siderails up x 2, use non-slip footwear, adequate lighting, clutter free and spill free environment, educate to call for assistance, educate on level of risk    Fall Risk Level Interventions:    TRIAL (TELE 8, NEURO, MED MARIMAR 5) Moderate Fall Risk Interventions  Place yellow fall risk ID band on patient: verified  Provide patient/family education based on risk assessment : completed  Educate patient/family to call staff for assistance when getting out of bed: completed  Place fall precaution signage outside patient door: verified  Utilize bed/chair fall alarm: verifiedTRIAL (TELE 8, NEURO, MED MARIMAR 5) High Fall Risk Interventions  Place yellow fall risk ID band on patient: verified  Provide patient/family education based on risk assessment: completed  Educate patient/family to call staff for assistance when getting out of bed: completed  Place fall precaution signage outside patient door: verified  Place patient in room close to nursing station: completed  Utilize bed/chair fall alarm: completed  Notify charge of high risk for huddle: completed    Patient Specific Interventions:     Medication: review medications with patient and family and assess for medications that can be discontinued or dosage decreased  Mental  Status/LOC/Awareness: reorient patient, reinforce falls education, check on patient hourly, utilize bed/chair fall alarm and reinforce the use of call light  Toileting: monitor intake and output/use of appropriate interventions  Volume/Electrolyte Status: ensure patient remains hydrated and monitor abnormal lab values  Communication/Sensory: update plan of care on whiteboard and ensure patient has glasses/contacts and hearing aids/dentures  Behavioral: administer medication as ordered and instruct/reinforce fall program rationale  Mobility: utilize bed/chair fall alarm and ensure bed is locked and in lowest position

## 2017-09-03 NOTE — PROGRESS NOTES
Received report at 1900 at bedside. Pt A&O x 4 no C/O  pain, . Completed assessment. POC discussed with patient. Bed alarm in use, belongings and call light within reach.

## 2017-09-03 NOTE — PROGRESS NOTES
Pt in bed resting. Xray came in the take image of stomach for cortrak placement. Pt states no pain or discomfort at this time. Call light in place and bed in lowest position

## 2017-09-03 NOTE — PROGRESS NOTES
[facility] Hospitalist Progress Note    Date of Service: 9/3/2017    Chief Complaint  89/M with HTN, transferred from [facility] for continued creatinine elevation (crea 7.29) despite IVF, causing respiratory failure and worsening of chronic cough, responding to diuretics. (+) unstageable sacral ulcer. BNP high in the s. Crea 7.39. Trop elevated at 0.23, felt to be due to ALBERTO. Has been retaining urine there and saavedra placed. CXR showed multifocal consolidation. Started on antibiotics for pneumonia. Started on lasix. Nephrology consulted.     Interval Problem Update  9/3/2017 - uneventful night. VSS. Afebrile. Saturating well on RA. No leukocytosis. Hgb 10.8. Crea worsened to 8.01. Na, K WNL. BNP remains high at 2805. UA showed  RBC, 30 protein. PCT high at 0.67. Renal US showed increased echogenecity B/L c/w medical renal disease, with no hydronephrosis. 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 Chest CT showed multifocal pneumonia, with reactive mediastinal adenopathy, and moderate B/L pleural effusions. Nephrology felt this was CKD. Diuresed well with IV lasix. Failed swallow eval, SLP recommended cortrak.     > Seen and examined. Breathing better. (+) cough with greenish phlegm. No CP, N/V, palpitations. Diuresed ~300cc post lasix.       Consultants/Specialty  Nephrology    Disposition  Monitor on telemetry. Await PT/OT eval. Anticipate need for SNF.       ROS     Pertinent positives/negatives as mentioned above.     A complete review of systems was done. All other systems were negative.      Physical Exam  Laboratory/Imaging   Hemodynamics  Temp (24hrs), Av.2 °C (97.2  °F), Min:36.1 °C (96.9 °F), Max:36.3 °C (97.4 °F)   Temperature: 36.3 °C (97.3 °F)  Pulse  Av.1  Min: 65  Max: 76    Blood Pressure : 124/68      Respiratory      Respiration: 18, Pulse Oximetry: 94 %, O2 Daily Delivery Respiratory : Room Air with O2 Available     Work Of Breathing / Effort: Moderate  RUL Breath Sounds: Clear, RML Breath Sounds: Clear, RLL Breath Sounds: Diminished, INGRIS Breath Sounds: Clear, LLL Breath Sounds: Diminished    Fluids    Intake/Output Summary (Last 24 hours) at 17 0725  Last data filed at 17 0500   Gross per 24 hour   Intake                0 ml   Output             1360 ml   Net            -1360 ml       Nutrition  Orders Placed This Encounter   Procedures   • DIET NPO     Standing Status:   Standing     Number of Occurrences:   1     Order Specific Question:   Restrict to:     Answer:   Strict [1]     Physical Exam   Constitutional: He is oriented to person, place, and time. He appears well-developed.   Hard of hearing. (+) malnourished   HENT:   Head: Normocephalic and atraumatic.   Mouth/Throat: Oropharynx is clear and moist. No oropharyngeal exudate.   Eyes: EOM are normal. Pupils are equal, round, and reactive to light. Right eye exhibits no discharge. Left eye exhibits no discharge. No scleral icterus.   Neck: Normal range of motion. Neck supple. No thyromegaly present.   Cardiovascular: Normal rate and regular rhythm.  Exam reveals no gallop and no friction rub.    No murmur heard.  Pulmonary/Chest: Effort normal. No respiratory distress. He has no wheezes. He has no rales.   Diminished air entry B/L bases, clear today   Abdominal: Soft. Bowel sounds are normal. He exhibits no distension. There is no tenderness. There is no rebound and no guarding.   Musculoskeletal: Normal range of motion. He exhibits edema (1+ B/L LE, improved). He exhibits no tenderness.   Lymphadenopathy:     He has no cervical adenopathy.   Neurological: He is alert and oriented to person,  place, and time. No cranial nerve deficit. Coordination normal.   Skin: Skin is warm and dry. No rash noted. No erythema.   Psychiatric: He has a normal mood and affect. His behavior is normal. Thought content normal.   Vitals reviewed.      Recent Labs      09/02/17   0331  09/03/17   0230   WBC  6.1  6.3   RBC  3.49*  3.72*   HEMOGLOBIN  10.2*  10.8*   HEMATOCRIT  31.0*  32.0*   MCV  88.8  86.0   MCH  29.2  29.0   MCHC  32.9*  33.8   RDW  50.7*  49.1   PLATELETCT  189  244   MPV  11.8  11.2     Recent Labs      09/02/17   0331  09/03/17   0230   SODIUM  137  142   POTASSIUM  4.6  4.7   CHLORIDE  110  110   CO2  12*  14*   GLUCOSE  93  84   BUN  130*  147*   CREATININE  7.39*  8.01*   CALCIUM  6.3*  6.9*         Recent Labs      09/02/17   0331  09/02/17   1257  09/03/17   0230   BNPBTYPENAT  2871*  2882*  2805*              Assessment/Plan     Acute renal failure (ARF) (CMS-HCC)- (present on admission)   Assessment & Plan    - unclear cause, suspect CKD/ESRD. No hydronephrosis on US.   - continue saavedra, monitor UO.   - continue IV lasix for now. Close UO monitoring.   - continue to avoid nephrotoxins. Renally dose medications.   - Nephrology following. May need dialysis eventually but pt lives in an area without outpatient dialysis center.         Dysphagia- (present on admission)   Assessment & Plan    - failed swallow eval.   - insert cortrak, and start tube feeding. Continue swallow therapies per SLP.         Unstageable pressure ulcer of sacral region ([facility])- (present on admission)   Assessment & Plan    - continue wound care per wound service.         Anemia- (present on admission)   Assessment & Plan    - due to anemia of renal disease/chronic disease with low iron and high ferritin.   - monitor Hgb daily, transfuse for Hgb<7.        Urinary retention- (present on admission)   Assessment & Plan    - possibly contributing to renal failure.   - maintain on saavedra catheter. Continue PO flomax.         Hypocalcemia, corrected to low albumin- (present on admission)   Assessment & Plan    - monitor.         Pneumonia, community acquired, possible aspiration pneumonia- (present on admission)   Assessment & Plan    - continue unasyn, and doxycycline. Continue to trend procalcitonin. Send for sputum cultures if with phlegm.  - maintain NPO. Insert cortrak, and start tube feeding per SLP.           Troponin level elevated- (present on admission)   Assessment & Plan    - likely demand ischemia from ALBERTO. No CP or symptoms. No regional hypokinesia on echo.   - continue to monitor on telemetry.         Elevated brain natriuretic peptide (BNP) level- (present on admission)   Assessment & Plan    - due to renal failure. Ventricular function ok.   - continue lasix 80mg IV BID.           Severe protein-calorie malnutrition, hypoalbuminemia (CMS-HCC)- (present on admission)   Assessment & Plan    - tube feeding per RD/nutrition.         Hypertension- (present on admission)   Assessment & Plan    - well controlled.   - continue norvasc and atenolol. Continue to monitor BP. Continue PRN IV labetalol for significant HTN.              Reviewed items::  Labs reviewed, Radiology images reviewed and Medications reviewed  Gonzalez catheter::  No Gonzalez  DVT prophylaxis pharmacological::  Heparin  Ulcer Prophylaxis::  Not indicated  Antibiotics:  Treating active infection/contamination beyond 24 hours perioperative coverage

## 2017-09-03 NOTE — PROGRESS NOTES
Hospital Medicine Progress Note, Adult, Complex               Author: Fadi Najjar Date & Time created: 9/3/2017  1:45 PM     Interval History:  Pt presented with generalized weakness, found in ALBERTO.  No new complaints    Review of Systems:  Review of Systems   Constitutional: Negative for chills and fever.   Cardiovascular: Negative for chest pain and palpitations.   Gastrointestinal: Negative for nausea and vomiting.   All other systems reviewed and are negative.      Physical Exam:  Physical Exam   Constitutional: He is oriented to person, place, and time. He appears well-developed and well-nourished. No distress.   HENT:   Head: Normocephalic and atraumatic.   Right Ear: External ear normal.   Left Ear: External ear normal.   Nose: Nose normal.   Mouth/Throat: No oropharyngeal exudate.   Eyes: Conjunctivae are normal. Right eye exhibits no discharge. Left eye exhibits no discharge. No scleral icterus.   Neck: Neck supple. No JVD present. No tracheal deviation present. No thyromegaly present.   Cardiovascular: Normal rate, regular rhythm and normal heart sounds.    No murmur heard.  Pulmonary/Chest: Effort normal and breath sounds normal. No respiratory distress. He has no wheezes. He has no rales.   Abdominal: Soft. Bowel sounds are normal. He exhibits no distension. There is no tenderness. There is no rebound.   Musculoskeletal: He exhibits edema. He exhibits no tenderness.   Lymphadenopathy:     He has no cervical adenopathy.   Neurological: He is oriented to person, place, and time.   Decreased hearing    Skin: Skin is warm and dry. He is not diaphoretic. No erythema.   Psychiatric: He has a normal mood and affect. His behavior is normal.   Nursing note and vitals reviewed.      Labs:        Invalid input(s): [id number]  Recent Labs      09/02/17   0331  09/02/17   1257  09/03/17   0230   TROPONINI  0.20*   --    --    BNPBTYPENAT  2871*  2882*  2805*     Recent Labs      09/02/17   0331  09/03/17   0230    SODIUM  137  142   POTASSIUM  4.6  4.7   CHLORIDE  110  110   CO2  12*  14*   BUN  130*  147*   CREATININE  7.39*  8.01*   CALCIUM  6.3*  6.9*     Recent Labs      17   0331  17   0230   ALTSGPT  14   --    ASTSGOT  22   --    ALKPHOSPHAT  162*   --    TBILIRUBIN  0.4   --    GLUCOSE  93  84     Recent Labs      17   0331  17   1257  17   0230   RBC  3.49*   --   3.72*   HEMOGLOBIN  10.2*   --   10.8*   HEMATOCRIT  31.0*   --   32.0*   PLATELETCT  189   --   244   IRON   --   34*   --    FERRITIN   --   1105.6*   --    TOTIRONBC   --   133*   --      Recent Labs      17   03317   0230   WBC  6.1  6.3   NEUTSPOLYS  80.50*  77.20*   LYMPHOCYTES  9.30*  9.60*   MONOCYTES  9.00  11.70   EOSINOPHILS  0.30  0.80   BASOPHILS  0.20  0.20   ASTSGOT  22   --    ALTSGPT  14   --    ALKPHOSPHAT  162*   --    TBILIRUBIN  0.4   --            Hemodynamics:  Temp (24hrs), Av.3 °C (97.3 °F), Min:36.1 °C (96.9 °F), Max:36.7 °C (98 °F)  Temperature: 36.7 °C (98 °F)  Pulse  Av.2  Min: 65  Max: 76   Blood Pressure : 120/63     Respiratory:    Respiration: (!) 24, Pulse Oximetry: 93 %     Work Of Breathing / Effort: Mild  RUL Breath Sounds: Clear, RML Breath Sounds: Clear, RLL Breath Sounds: Diminished, INGRIS Breath Sounds: Clear, LLL Breath Sounds: Diminished  Fluids:    Intake/Output Summary (Last 24 hours) at 17 1345  Last data filed at 17 0500   Gross per 24 hour   Intake                0 ml   Output             1360 ml   Net            -1360 ml     Weight: 82.2 kg (181 lb 3.5 oz)  GI/Nutrition:  Orders Placed This Encounter   Procedures   • DIET NPO     Standing Status:   Standing     Number of Occurrences:   1     Order Specific Question:   Restrict to:     Answer:   Strict [1]     Medical Decision Making, by Problem:  Active Hospital Problems    Diagnosis   • Acute renal failure (ARF) (CMS-HCC) [N17.9]   • Dysphagia [R13.10]   • Elevated brain natriuretic peptide  (BNP) level [R79.89]   • Troponin level elevated [R74.8]   • Pneumonia, community acquired, possible aspiration pneumonia [J18.9]   • Hypocalcemia, corrected to low albumin [E83.51]   • Urinary retention [R33.9]   • Anemia [D64.9]   • Unstageable pressure ulcer of sacral region (CMS-HCC) [L89.150]   • Hypertension [I10]   • Severe protein-calorie malnutrition, hypoalbuminemia (CMS-HCC) [E43]         Reviewed items::  Labs reviewed, Medications reviewed and Radiology images reviewed  Gonzalez catheter::  Critically Ill - Requiring Accurate Measurement of Urinary Output  1 ALBERTO: no signs of renal recovery, possible ESRD.  2 volume overload/ edema: sec to 3ed spacing  3 hypoalbuminemia  4 Anemia  5 hypocalcemia: corrected Ca is Ok  6 proteinuria: possibel acute GN but I will hold off a kidney biopsy because the poor prognosis in general  Plan  no acute need for HD  Pt is a poor candidate for HD because of age/ comorbidity and living situation as no close dialysis close to his home  Renal dose all meds  Avoid nephrotoxins  Prognosis poor.  Consider palliative consult  D/W Dr adams

## 2017-09-03 NOTE — PROGRESS NOTES
Pt in bed resting. No signs of pain or discomfort noted. Will continue to monitor. Call light in place and bed in lowest positon

## 2017-09-03 NOTE — CARE PLAN
Problem: Safety  Goal: Will remain free from injury  Outcome: PROGRESSING AS EXPECTED  Pt is A&Ox4. Pt calls appropriately. Treaded socks are on.       Problem: Knowledge Deficit  Goal: Knowledge of disease process/condition, treatment plan, diagnostic tests, and medications will improve  Outcome: PROGRESSING AS EXPECTED  Plan of care discussed with patient at bedside. Pt verbalizes understanding.

## 2017-09-03 NOTE — DIETARY
"Nutrition Support Assessment - Male    Aravind Camejo is a 89 y.o. male with admitting DX of Renal Failure    Pertinent History: HTN  Allergies:  Review of patient's allergies indicates no known allergies.    Height: 182.9 cm (6' 0.01\")  Weight: 82.2 kg (181 lb 3.5 oz)  Ideal Body Weight: 80.7 kg (178 lb)  Percent Ideal Body Weight: 101.8  Body mass index is 24.57 kg/m².     Pertinent Labs: , Creatinine 8.01  Pertinent Medications: norvasc, unasyn, tenormin, vibramycin, lasix, pericolace, flomax  Pertinent Fluids: No IV fluids noted in MAR  Surgery / Procedures: None noted  Skin: wound POA other sacrum, wound POA other coccyx. Wound team consult pending, will await wound staging to make recommendations if appropriate.   Last BM: 17    Estimated Needs: REE per MSJ x 1.2 = 1820 kcal/day  Total Calories / day: 1800 - 2100  (Calories / k - 26)  Total Grams Protein / day: 82 - 99(Grams Protein / k - 1.2)  Total Fluids ml / day: 2058.6 ml         Assessment / Evaluation:   Consult received for TF assessment. Per SLP Recommend NPO/cortrak.     Plan / Recommendation:   Start Fibersource HN @ 25 ml/hr and advance per protocol to 70 ml/hr (goal rate) to provide 2016 kcal (25 kcal/kg), 91 grams protein (1.1 gm/kg), and 1361 ml free water per day.   Fluids per MD. PEÑA following.         "

## 2017-09-03 NOTE — ASSESSMENT & PLAN NOTE
- failed swallow eval.   - insert cortrak, and start tube feeding. Continue swallow therapies per SLP.

## 2017-09-04 ENCOUNTER — APPOINTMENT (OUTPATIENT)
Dept: RADIOLOGY | Facility: MEDICAL CENTER | Age: 82
DRG: 682 | End: 2017-09-04
Attending: INTERNAL MEDICINE
Payer: MEDICARE

## 2017-09-04 PROBLEM — R60.1 ANASARCA: Status: ACTIVE | Noted: 2017-09-04

## 2017-09-04 LAB
ANION GAP SERPL CALC-SCNC: 17 MMOL/L (ref 0–11.9)
BUN SERPL-MCNC: 161 MG/DL (ref 8–22)
CALCIUM SERPL-MCNC: 7.2 MG/DL (ref 8.5–10.5)
CHLORIDE SERPL-SCNC: 109 MMOL/L (ref 96–112)
CO2 SERPL-SCNC: 16 MMOL/L (ref 20–33)
CREAT SERPL-MCNC: 9.51 MG/DL (ref 0.5–1.4)
CRP SERPL HS-MCNC: 19.95 MG/DL (ref 0–0.75)
GFR SERPL CREATININE-BSD FRML MDRD: 5 ML/MIN/1.73 M 2
GLUCOSE SERPL-MCNC: 89 MG/DL (ref 65–99)
PHOSPHATE SERPL-MCNC: 10.8 MG/DL (ref 2.5–4.5)
POTASSIUM SERPL-SCNC: 5 MMOL/L (ref 3.6–5.5)
PREALB SERPL-MCNC: 4 MG/DL (ref 18–38)
PROCALCITONIN SERPL-MCNC: 0.6 NG/ML
SODIUM SERPL-SCNC: 142 MMOL/L (ref 135–145)

## 2017-09-04 PROCEDURE — 700105 HCHG RX REV CODE 258: Performed by: INTERNAL MEDICINE

## 2017-09-04 PROCEDURE — 84100 ASSAY OF PHOSPHORUS: CPT

## 2017-09-04 PROCEDURE — 700111 HCHG RX REV CODE 636 W/ 250 OVERRIDE (IP): Performed by: INTERNAL MEDICINE

## 2017-09-04 PROCEDURE — 97161 PT EVAL LOW COMPLEX 20 MIN: CPT

## 2017-09-04 PROCEDURE — G8989 SELF CARE D/C STATUS: HCPCS | Mod: CK

## 2017-09-04 PROCEDURE — 84134 ASSAY OF PREALBUMIN: CPT

## 2017-09-04 PROCEDURE — 770020 HCHG ROOM/CARE - TELE (206)

## 2017-09-04 PROCEDURE — A9270 NON-COVERED ITEM OR SERVICE: HCPCS | Performed by: INTERNAL MEDICINE

## 2017-09-04 PROCEDURE — 86140 C-REACTIVE PROTEIN: CPT

## 2017-09-04 PROCEDURE — G8988 SELF CARE GOAL STATUS: HCPCS | Mod: CK

## 2017-09-04 PROCEDURE — G8978 MOBILITY CURRENT STATUS: HCPCS | Mod: CK

## 2017-09-04 PROCEDURE — G8987 SELF CARE CURRENT STATUS: HCPCS | Mod: CK

## 2017-09-04 PROCEDURE — G8980 MOBILITY D/C STATUS: HCPCS | Mod: CK

## 2017-09-04 PROCEDURE — 71010 DX-CHEST-PORTABLE (1 VIEW): CPT

## 2017-09-04 PROCEDURE — 84145 PROCALCITONIN (PCT): CPT

## 2017-09-04 PROCEDURE — 97165 OT EVAL LOW COMPLEX 30 MIN: CPT

## 2017-09-04 PROCEDURE — 80048 BASIC METABOLIC PNL TOTAL CA: CPT

## 2017-09-04 PROCEDURE — 36415 COLL VENOUS BLD VENIPUNCTURE: CPT

## 2017-09-04 PROCEDURE — G8979 MOBILITY GOAL STATUS: HCPCS | Mod: CK

## 2017-09-04 PROCEDURE — 700102 HCHG RX REV CODE 250 W/ 637 OVERRIDE(OP): Performed by: INTERNAL MEDICINE

## 2017-09-04 PROCEDURE — 99233 SBSQ HOSP IP/OBS HIGH 50: CPT | Performed by: INTERNAL MEDICINE

## 2017-09-04 RX ORDER — MORPHINE SULFATE 10 MG/ML
10 INJECTION, SOLUTION INTRAMUSCULAR; INTRAVENOUS
Status: DISCONTINUED | OUTPATIENT
Start: 2017-09-04 | End: 2017-09-14 | Stop reason: HOSPADM

## 2017-09-04 RX ORDER — POLYVINYL ALCOHOL 14 MG/ML
2 SOLUTION/ DROPS OPHTHALMIC EVERY 6 HOURS PRN
Status: DISCONTINUED | OUTPATIENT
Start: 2017-09-04 | End: 2017-09-14 | Stop reason: HOSPADM

## 2017-09-04 RX ORDER — MORPHINE SULFATE 100 MG/5ML
10 SOLUTION ORAL
Status: DISCONTINUED | OUTPATIENT
Start: 2017-09-04 | End: 2017-09-14 | Stop reason: HOSPADM

## 2017-09-04 RX ORDER — POLYETHYLENE GLYCOL 3350 17 G/17G
1 POWDER, FOR SOLUTION ORAL
Status: DISCONTINUED | OUTPATIENT
Start: 2017-09-04 | End: 2017-09-14 | Stop reason: HOSPADM

## 2017-09-04 RX ORDER — GLYCOPYRROLATE 1 MG/1
1 TABLET ORAL 3 TIMES DAILY PRN
Status: DISCONTINUED | OUTPATIENT
Start: 2017-09-04 | End: 2017-09-14 | Stop reason: HOSPADM

## 2017-09-04 RX ORDER — MORPHINE SULFATE 10 MG/ML
5 INJECTION, SOLUTION INTRAMUSCULAR; INTRAVENOUS
Status: DISCONTINUED | OUTPATIENT
Start: 2017-09-04 | End: 2017-09-14 | Stop reason: HOSPADM

## 2017-09-04 RX ORDER — BISACODYL 10 MG
10 SUPPOSITORY, RECTAL RECTAL
Status: DISCONTINUED | OUTPATIENT
Start: 2017-09-04 | End: 2017-09-14 | Stop reason: HOSPADM

## 2017-09-04 RX ORDER — AMOXICILLIN 250 MG
2 CAPSULE ORAL 2 TIMES DAILY
Status: DISCONTINUED | OUTPATIENT
Start: 2017-09-04 | End: 2017-09-14 | Stop reason: HOSPADM

## 2017-09-04 RX ADMIN — HEPARIN SODIUM 5000 UNITS: 5000 INJECTION, SOLUTION INTRAVENOUS; SUBCUTANEOUS at 05:25

## 2017-09-04 RX ADMIN — AMPICILLIN SODIUM AND SULBACTAM SODIUM 3 G: 2; 1 INJECTION, POWDER, FOR SOLUTION INTRAMUSCULAR; INTRAVENOUS at 08:24

## 2017-09-04 RX ADMIN — ATENOLOL 50 MG: 50 TABLET ORAL at 08:24

## 2017-09-04 RX ADMIN — STANDARDIZED SENNA CONCENTRATE AND DOCUSATE SODIUM 2 TABLET: 8.6; 5 TABLET, FILM COATED ORAL at 08:25

## 2017-09-04 RX ADMIN — FUROSEMIDE 80 MG: 10 INJECTION, SOLUTION INTRAMUSCULAR; INTRAVENOUS at 05:25

## 2017-09-04 RX ADMIN — AMLODIPINE BESYLATE 5 MG: 5 TABLET ORAL at 08:25

## 2017-09-04 RX ADMIN — STANDARDIZED SENNA CONCENTRATE AND DOCUSATE SODIUM 2 TABLET: 8.6; 5 TABLET, FILM COATED ORAL at 20:37

## 2017-09-04 RX ADMIN — TAMSULOSIN HYDROCHLORIDE 0.4 MG: 0.4 CAPSULE ORAL at 08:24

## 2017-09-04 ASSESSMENT — COGNITIVE AND FUNCTIONAL STATUS - GENERAL
PERSONAL GROOMING: A LOT
HELP NEEDED FOR BATHING: A LOT
STANDING UP FROM CHAIR USING ARMS: A LOT
WALKING IN HOSPITAL ROOM: A LOT
MOVING FROM LYING ON BACK TO SITTING ON SIDE OF FLAT BED: A LOT
TURNING FROM BACK TO SIDE WHILE IN FLAT BAD: A LITTLE
DRESSING REGULAR LOWER BODY CLOTHING: A LOT
EATING MEALS: A LITTLE
DRESSING REGULAR UPPER BODY CLOTHING: A LOT
MOVING TO AND FROM BED TO CHAIR: A LOT
WALKING IN HOSPITAL ROOM: A LOT
CLIMB 3 TO 5 STEPS WITH RAILING: TOTAL
MOVING TO AND FROM BED TO CHAIR: A LOT
MOVING TO AND FROM BED TO CHAIR: A LOT
STANDING UP FROM CHAIR USING ARMS: A LOT
DAILY ACTIVITIY SCORE: 13
MOVING FROM LYING ON BACK TO SITTING ON SIDE OF FLAT BED: A LOT
TOILETING: A LOT
CLIMB 3 TO 5 STEPS WITH RAILING: TOTAL
TURNING FROM BACK TO SIDE WHILE IN FLAT BAD: A LITTLE
HELP NEEDED FOR BATHING: A LOT
TOILETING: A LOT
DRESSING REGULAR UPPER BODY CLOTHING: A LOT
SUGGESTED CMS G CODE MODIFIER MOBILITY: CL
STANDING UP FROM CHAIR USING ARMS: A LOT
TOILETING: A LOT
PERSONAL GROOMING: A LOT
TURNING FROM BACK TO SIDE WHILE IN FLAT BAD: A LITTLE
DAILY ACTIVITIY SCORE: 13
DRESSING REGULAR UPPER BODY CLOTHING: A LOT
SUGGESTED CMS G CODE MODIFIER DAILY ACTIVITY: CL
SUGGESTED CMS G CODE MODIFIER MOBILITY: CL
SUGGESTED CMS G CODE MODIFIER DAILY ACTIVITY: CK
EATING MEALS: A LITTLE
EATING MEALS: A LITTLE
HELP NEEDED FOR BATHING: A LOT
WALKING IN HOSPITAL ROOM: A LOT
CLIMB 3 TO 5 STEPS WITH RAILING: TOTAL
DAILY ACTIVITIY SCORE: 14
SUGGESTED CMS G CODE MODIFIER DAILY ACTIVITY: CL
PERSONAL GROOMING: A LITTLE
MOBILITY SCORE: 12
DRESSING REGULAR LOWER BODY CLOTHING: A LOT
MOVING FROM LYING ON BACK TO SITTING ON SIDE OF FLAT BED: A LOT
DRESSING REGULAR LOWER BODY CLOTHING: A LOT
MOBILITY SCORE: 12
MOBILITY SCORE: 12
SUGGESTED CMS G CODE MODIFIER MOBILITY: CL

## 2017-09-04 ASSESSMENT — PATIENT HEALTH QUESTIONNAIRE - PHQ9
SUM OF ALL RESPONSES TO PHQ QUESTIONS 1-9: 1
3. TROUBLE FALLING OR STAYING ASLEEP OR SLEEPING TOO MUCH: NOT AT ALL
8. MOVING OR SPEAKING SO SLOWLY THAT OTHER PEOPLE COULD HAVE NOTICED. OR THE OPPOSITE, BEING SO FIGETY OR RESTLESS THAT YOU HAVE BEEN MOVING AROUND A LOT MORE THAN USUAL: NOT AT ALL
2. FEELING DOWN, DEPRESSED, IRRITABLE, OR HOPELESS: SEVERAL DAYS
1. LITTLE INTEREST OR PLEASURE IN DOING THINGS: NOT AT ALL
9. THOUGHTS THAT YOU WOULD BE BETTER OFF DEAD, OR OF HURTING YOURSELF: NOT AT ALL
4. FEELING TIRED OR HAVING LITTLE ENERGY: NOT AT ALL
SUM OF ALL RESPONSES TO PHQ9 QUESTIONS 1 AND 2: 1
6. FEELING BAD ABOUT YOURSELF - OR THAT YOU ARE A FAILURE OR HAVE LET YOURSELF OR YOUR FAMILY DOWN: NOT AL ALL
5. POOR APPETITE OR OVEREATING: NOT AT ALL
7. TROUBLE CONCENTRATING ON THINGS, SUCH AS READING THE NEWSPAPER OR WATCHING TELEVISION: NOT AT ALL

## 2017-09-04 ASSESSMENT — PAIN SCALES - GENERAL
PAINLEVEL_OUTOF10: 0

## 2017-09-04 ASSESSMENT — GAIT ASSESSMENTS
DEVIATION: INCREASED BASE OF SUPPORT;BRADYKINETIC;SHUFFLED GAIT
ASSISTIVE DEVICE: FRONT WHEEL WALKER
DISTANCE (FEET): 5
GAIT LEVEL OF ASSIST: MINIMAL ASSIST

## 2017-09-04 ASSESSMENT — ACTIVITIES OF DAILY LIVING (ADL): TOILETING: INDEPENDENT

## 2017-09-04 ASSESSMENT — ENCOUNTER SYMPTOMS: WEAKNESS: 1

## 2017-09-04 NOTE — PROGRESS NOTES
Pt states he has a niece named Verona, telephone # [phone]. Dr. Flores attempted to contact niece to update on pt's condition, but no answer. Palliative care RN has the niece's # as well as bedside RN, and will also attempt to call w/ updates.

## 2017-09-04 NOTE — THERAPY
"Occupational Therapy Evaluation completed.   Functional Status: Mod A supine > EOB, min/mod A stand-pivot transfers with FWW, max A toileting and LB dressing  Plan of Care: Patient with no further skilled OT needs in the acute care setting at this time  Discharge Recommendations: Post-acute therapy Discharge to a transitional care facility for continued skilled therapy services.    See \"Rehab Therapy-Acute\" Patient Summary Report for complete documentation.    Pt is 89 y.o. male transferred from OSH with renal failure. Pt seen for OT eval. Pt's Cortrak had leaked onto bed linens. Assisted pt with transfer OOB and skin care. Pt also incontinent of BM. Assisted with commode use, then transfer to bedside chair. Pt is limited by generalized weakness, impaired motor planning during mobility, balance deficits impacting basic ADL and transfers. Pt would need 24 hour assist at this time. Since OT eval was completed, plan has been changed to transition to comfort care. Acute OT will not follow due to updated POC.     "

## 2017-09-04 NOTE — CARE PLAN
Problem: Communication  Goal: The ability to communicate needs accurately and effectively will improve  Outcome: PROGRESSING AS EXPECTED  POC discussed at bedside. Patient verbalizes understanding. Education on medications provided. White board updated, patient encouraged to call for all needs. Calls appropriately. No immediate concerns at this time.    Problem: Safety  Goal: Will remain free from injury  Outcome: PROGRESSING AS EXPECTED  Bed in lowest position. Educated patient on use of call light.

## 2017-09-04 NOTE — THERAPY
"Physical Therapy Evaluation completed.   Bed Mobility:  Supine to Sit: Moderate Assist  Transfers: Sit to Stand: Minimal Assist  Gait: Level Of Assist: Minimal Assist with Front-Wheel Walker       Plan of Care: Patient with no further skilled PT needs in the acute care setting at this time  Discharge Recommendations: Equipment: Will Continue to Assess for Equipment Needs. Post-acute therapy Discharge to a transitional care facility for continued skilled therapy services.    See \"Rehab Therapy-Acute\" Patient Summary Report for complete documentation.     "

## 2017-09-04 NOTE — PROGRESS NOTES
Hospital Medicine Progress Note, Adult, Complex               Author: Nadir Ferrer Date & Time created: 9/4/2017  12:26 PM     Interval History:  89 year old with generalized weakness and severe, progressive ALBERTO. Aggressive care has been discussed with patient, and he understands, wants comfort care.    Review of Systems:  Review of Systems   Constitutional: Positive for malaise/fatigue.   Neurological: Positive for weakness.       Physical Exam:  Physical Exam   Constitutional: He appears well-developed and well-nourished.   Cardiovascular: Normal rate and regular rhythm.    Pulmonary/Chest: Effort normal and breath sounds normal.   Neurological: He is alert.   Skin: Skin is warm and dry.       Labs:        Invalid input(s): [id number]  Recent Labs      09/02/17 0331 09/02/17 1257 09/03/17   0230   TROPONINI  0.20*   --    --    BNPBTYPENAT  2871*  2882*  2805*     Recent Labs      09/02/17 0331 09/03/17 0230 09/04/17   0255   SODIUM  137  142  142   POTASSIUM  4.6  4.7  5.0   CHLORIDE  110  110  109   CO2  12*  14*  16*   BUN  130*  147*  161*   CREATININE  7.39*  8.01*  9.51*   PHOSPHORUS   --    --   10.8*   CALCIUM  6.3*  6.9*  7.2*     Recent Labs      09/02/17 0331 09/03/17   0230  09/04/17   0255   ALTSGPT  14   --    --    ASTSGOT  22   --    --    ALKPHOSPHAT  162*   --    --    TBILIRUBIN  0.4   --    --    PREALBUMIN   --    --   4.0*   GLUCOSE  93  84  89     Recent Labs      09/02/17 0331 09/02/17 1257 09/03/17   0230   RBC  3.49*   --   3.72*   HEMOGLOBIN  10.2*   --   10.8*   HEMATOCRIT  31.0*   --   32.0*   PLATELETCT  189   --   244   IRON   --   34*   --    FERRITIN   --   1105.6*   --    TOTIRONBC   --   133*   --      Recent Labs      09/02/17 0331 09/03/17   0230   WBC  6.1  6.3   NEUTSPOLYS  80.50*  77.20*   LYMPHOCYTES  9.30*  9.60*   MONOCYTES  9.00  11.70   EOSINOPHILS  0.30  0.80   BASOPHILS  0.20  0.20   ASTSGOT  22   --    ALTSGPT  14   --    ALKPHOSPHAT   162*   --    TBILIRUBIN  0.4   --            Hemodynamics:  Temp (24hrs), Av °C (98.6 °F), Min:36.6 °C (97.8 °F), Max:37.3 °C (99.1 °F)  Temperature: 37.2 °C (99 °F)  Pulse  Av.1  Min: 65  Max: 80   Blood Pressure : 132/56     Respiratory:    Respiration: 20, Pulse Oximetry: 92 %     Work Of Breathing / Effort: Mild  RUL Breath Sounds: Clear, RML Breath Sounds: Clear, RLL Breath Sounds: Diminished, INGRIS Breath Sounds: Clear, LLL Breath Sounds: Diminished  Fluids:    Intake/Output Summary (Last 24 hours) at 17 1226  Last data filed at 17 0600   Gross per 24 hour   Intake                0 ml   Output             1615 ml   Net            -1615 ml     Weight: 82 kg (180 lb 12.4 oz)  GI/Nutrition:  Orders Placed This Encounter   Procedures   • DIET ORDER     Standing Status:   Standing     Number of Occurrences:   1     Order Specific Question:   Diet:     Answer:   Regular [1]     Medical Decision Making, by Problem:  Active Hospital Problems    Diagnosis   • Acute renal failure (ARF) (CMS-HCC) [N17.9]   • Anasarca due to renal failure, hypoalbuminemia [R60.1]   • Dysphagia [R13.10]   • Elevated brain natriuretic peptide (BNP) level [R79.89]   • Troponin level elevated [R74.8]   • Pneumonia, community acquired, possible aspiration pneumonia [J18.9]   • Hypocalcemia, corrected to low albumin [E83.51]   • Urinary retention [R33.9]   • Anemia [D64.9]   • Unstageable pressure ulcer of sacral region (CMS-HCC) [L89.150]   • Hypertension [I10]   • Severe protein-calorie malnutrition, hypoalbuminemia (CMS-HCC) [E43]     1. ALBERTO- progressive. Poor candidate for dialysis, and in discussion, wants comfort care.    At this point, will sign off, noted move to comfort care. I agree. Please call with any issues.  Quality-Core Measures

## 2017-09-04 NOTE — PROGRESS NOTES
Savita Bo Fall Risk Assessment:     Last Known Fall: Within the last six months  Mobility: Use of assistive device/requires assist of two people  Medications: Cardiovascular or central nervous system meds, Diuretics  Mental Status/LOC/Awareness: Awake, alert, and oriented to date, place, and person  Toileting Needs: Use of catheters or diversion devices  Volume/Electrolyte Status: NPO greater than 24 hours  Communication/Sensory: Visual (Glasses)/hearing deficit  Behavior: Appropriate behavior  Savita Bo Fall Risk Total: 15  Fall Risk Level: HIGH RISK    Universal Fall Precautions:  call light/belongings in reach, bed in low position and locked, wheelchairs and assistive devices out of sight, siderails up x 2, use non-slip footwear, adequate lighting, clutter free and spill free environment, educate to call for assistance, educate on level of risk    Fall Risk Level Interventions:    TRIAL (TELE 8, NEURO, MED MARIMAR 5) Moderate Fall Risk Interventions  Place yellow fall risk ID band on patient: verified  Provide patient/family education based on risk assessment : completed  Educate patient/family to call staff for assistance when getting out of bed: completed  Place fall precaution signage outside patient door: verified  Utilize bed/chair fall alarm: verifiedTRIAL (TELE 8, NEURO, MED MARIMAR 5) High Fall Risk Interventions  Place yellow fall risk ID band on patient: verified  Provide patient/family education based on risk assessment: completed  Educate patient/family to call staff for assistance when getting out of bed: completed  Place fall precaution signage outside patient door: verified  Place patient in room close to nursing station: completed  Utilize bed/chair fall alarm: completed  Notify charge of high risk for huddle: completed    Patient Specific Interventions:     Medication: review medications with patient and family  Mental Status/LOC/Awareness: reinforce falls education  Toileting: provide  frquent toileting  Volume/Electrolyte Status: monitor abnormal lab values  Communication/Sensory: update plan of care on whiteboard  Behavioral: encourage patient to voice feelings  Mobility: dangle prior to standing

## 2017-09-04 NOTE — PROGRESS NOTES
[facility] Hospitalist Progress Note    Date of Service: 9/4/2017    Chief Complaint  89/M with HTN, transferred from [facility] for continued creatinine elevation (crea 7.29) despite IVF, causing respiratory failure and worsening of chronic cough, responding to diuretics. (+) unstageable sacral ulcer. BNP high in the 2000s. Crea 7.39. Trop elevated at 0.23, felt to be due to ALBERTO. Has been retaining urine there and saavedra placed. CXR showed multifocal consolidation. Started on antibiotics for pneumonia. Started on lasix. Nephrology consulted, gave opinion of poor prognosis as no close dialysis center near home. Crea worsened to 8.01. Na, K WNL. BNP remains high at 2805. UA showed  RBC, 30 protein. PCT high at 0.67. Renal US showed increased echogenecity B/L c/w medical renal disease, with no hydronephrosis. 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 Chest CT showed multifocal pneumonia, with reactive mediastinal adenopathy, and moderate B/L pleural effusions. Nephrology felt this was CKD. Diuresed well with IV lasix. Failed swallow eval, cortrak placed, started on TF.       Interval Problem Update  9/4/2017 - no overnight events. Remains hemodynamically stable and afebrile. Stable on RA.  Diuresed well, but crea jumped to 9.51. Na, K WNL. PO4 10.8. PCT 0.60. CRP 19.95.    > Seen and examined. Per RN confused today. (+) increased work of breathing but remains on RA. I discussed advance care planning with the patient for at least 15 minutes, including diagnosis, prognosis, plan of care, risks and benefits of any therapies that could be offered, as well as alternatives including  palliation and hospice, as appropriate. Discussed with him about his grave prognosis, including failing kidneys, pneumonia, and significant debility. Patient states he does not want further aggressive therapies, and wants to be made comfort care. He does not want further blood draws, antibiotics. Amenable to hospice.         Consultants/Specialty  Nephrology  Palliative Care    Disposition  Monitor on telemetry. Palliative care consult.      ROS     Pertinent positives/negatives as mentioned above.     A complete review of systems was done. All other systems were negative.      Physical Exam  Laboratory/Imaging   Hemodynamics  Temp (24hrs), Av.8 °C (98.2 °F), Min:36.4 °C (97.6 °F), Max:37.3 °C (99.1 °F)   Temperature: 37.1 °C (98.8 °F)  Pulse  Av.1  Min: 65  Max: 80    Blood Pressure : 122/63      Respiratory      Respiration: 20, Pulse Oximetry: 93 %     Work Of Breathing / Effort: Mild  RUL Breath Sounds: Clear, RML Breath Sounds: Clear, RLL Breath Sounds: Diminished, INGRIS Breath Sounds: Clear, LLL Breath Sounds: Diminished    Fluids    Intake/Output Summary (Last 24 hours) at 17 0750  Last data filed at 17 0600   Gross per 24 hour   Intake                0 ml   Output             1615 ml   Net            -1615 ml       Nutrition  Orders Placed This Encounter   Procedures   • DIET NPO     Standing Status:   Standing     Number of Occurrences:   1     Order Specific Question:   Restrict to:     Answer:   Strict [1]     Physical Exam   Constitutional: He is oriented to person, place, and time. He appears well-developed.   HENT:   Head: Normocephalic and atraumatic.   Mouth/Throat: Oropharynx is clear and moist. No oropharyngeal exudate.   Eyes: EOM are normal. Pupils are equal, round, and reactive to light. Right eye exhibits no discharge. Left eye exhibits no discharge. No scleral icterus.   Neck: Normal range of motion. Neck supple. No thyromegaly present.   Cardiovascular: Normal rate and  regular rhythm.  Exam reveals no gallop and no friction rub.    No murmur heard.  Pulmonary/Chest: Effort normal. He has no wheezes. He has no rales. He exhibits no tenderness.   (+) bibasilar crackles   Abdominal: Soft. Bowel sounds are normal. There is no tenderness. There is no rebound and no guarding.   Musculoskeletal: Normal range of motion. He exhibits edema (anasarca). He exhibits no tenderness.   Lymphadenopathy:     He has no cervical adenopathy.   Neurological: He is alert and oriented to person, place, and time.   Skin: Skin is warm and dry. No rash noted. No erythema.   Psychiatric: He has a normal mood and affect. His behavior is normal. Judgment and thought content normal.   Vitals reviewed.      Recent Labs      09/02/17   0331 09/03/17   0230   WBC  6.1  6.3   RBC  3.49*  3.72*   HEMOGLOBIN  10.2*  10.8*   HEMATOCRIT  31.0*  32.0*   MCV  88.8  86.0   MCH  29.2  29.0   MCHC  32.9*  33.8   RDW  50.7*  49.1   PLATELETCT  189  244   MPV  11.8  11.2     Recent Labs      09/02/17   0331  09/03/17   0230  09/04/17   0255   SODIUM  137  142  142   POTASSIUM  4.6  4.7  5.0   CHLORIDE  110  110  109   CO2  12*  14*  16*   GLUCOSE  93  84  89   BUN  130*  147*  161*   CREATININE  7.39*  8.01*  9.51*   CALCIUM  6.3*  6.9*  7.2*         Recent Labs      09/02/17   0331  09/02/17   1257  09/03/17   0230   BNPBTYPENAT  2871*  2882*  2805*              Assessment/Plan     Active Problems:    Acute renal failure (ARF) (CMS-[facility]) POA: Yes    Elevated brain natriuretic peptide (BNP) level POA: Yes    Troponin level elevated POA: Yes    Pneumonia, community acquired, possible aspiration pneumonia POA: Yes    Hypocalcemia, corrected to low albumin POA: Yes    Urinary retention POA: Yes    Anemia POA: Yes    Unstageable pressure ulcer of sacral region (CMS-HCC) POA: Yes    Dysphagia POA: Yes    Anasarca due to renal failure, hypoalbuminemia POA: Yes    Hypertension POA: Yes    Severe protein-calorie malnutrition,  hypoalbuminemia (CMS-HCC) POA: Yes  Resolved Problems:    * No resolved hospital problems. *      - he will be transitioned to comfort care. D/C all non-comfort medications including antibiotics, and lasix. D/C cortrak and tube feeding, and start comfort feeding.   - comfort care order set placed. Will start PRN PO roxanol, robinul, IV morphine for comfort. Start bowel protocol.   - palliative care on-board. Will have general inpatient hospice to evaluate for appropriateness for transfer of care to their service. Anticipate mortality in <14 days.     Time spent: 30 minutes CRITICAL CARE TIME, including discussing goals of care, transitioning to comfort care, managing medical issues, coordination of care, not including doing procedures, no overlap.      Reviewed items::  Labs reviewed, Radiology images reviewed and Medications reviewed  Gonzalez catheter::  No Gonzalez  DVT: Not indicated, comfort care.  Ulcer Prophylaxis::  Not indicated

## 2017-09-04 NOTE — CONSULTS
"Reason for PC Consult: Advance Care Planning    Consulted by: Dr. Flores    Assessment:  General: 89 yr male admitted 9/2/17 from local [facility] with elevated creatinine levels and post fluid challenge respiratory failure.  Only past medical history noted is HTN and un-stagable ulcer on the sacrum.  Palliative Care referral received for advance care planning/goals of care on 9/4/2017     Dyspnea: No-  (O2 for comfort 2-3L/NC)  Last BM: 09/04/17-    Pain: No-    Depression: Mood appropriate for situation-      Spiritual:  Is Confucianism or spirituality important for coping with this illness? No-    Has a  or spiritual provider visit been requested? Yes    Palliative Performance Scale: 20%    Advance Directive: None on file    DPOA: No-  NOK  Esteban Harris ([phone] or [phone])  POLST: No- Completed during visit    Code Status: DNR-      Outcome:  I met with the patient at bedside and presented Palliative Care services.  Patient sitting up at bedside, awake, alert and oriented x 4 (person, place, time and event).  I questioned his agreement to comfort measures and he was able to repeat lee to me the conversation with Dr. Flores and wanting only to be comfortable.  Patient understands and agrees to comfort measures only at this time.    Patient shares that he has live \"many years\" in [place].  He has never been  and has no children, that he is aware of.  He states that he has no living siblings; only a niece, Ml Dorsey, who also lives in [place].  I have received permission to contact her and provide updated information.  He worked as a  and also an \"\" in a crushing mine.    POLST completed, signed, scanned and placed on the patient's chart.    I placed a call to the [phone] number and left a generic message requesting a return call back.       Updated: MD, RN aware    Plan: Continue to contact Esteban, requested Hospice Order for possible GIP.  Continue to " follow.      Thank you for allowing Palliative Care to participate in this patient's care. Please feel free to call [phone] with any questions or concerns.

## 2017-09-05 PROCEDURE — 770001 HCHG ROOM/CARE - MED/SURG/GYN PRIV*

## 2017-09-05 PROCEDURE — 99232 SBSQ HOSP IP/OBS MODERATE 35: CPT | Performed by: HOSPITALIST

## 2017-09-05 ASSESSMENT — PAIN SCALES - GENERAL
PAINLEVEL_OUTOF10: 0

## 2017-09-05 NOTE — CARE PLAN
Problem: Nutritional:  Goal: Nutrition support tolerated and meeting greater than 85% of estimated needs  Outcome: NOT MET  TF d/c 2' pt transitioned to comfort care; therefore, on regular diet.

## 2017-09-05 NOTE — PROGRESS NOTES
Bedside report received. POC discussed with previous RN and Pt, Pt verbalizes understanding. Pt is oriented x4. Bed low and locked, Pt sitting in chair, call light in reach, Pt verbalizes no needs at this time.

## 2017-09-05 NOTE — CARE PLAN
Problem: Venous Thromboembolism (VTW)/Deep Vein Thrombosis (DVT) Prevention:  Goal: Patient will participate in Venous Thrombosis (VTE)/Deep Vein Thrombosis (DVT)Prevention Measures  Outcome: PROGRESSING AS EXPECTED  Pt verbalizes need to wear MITZI hose, and the importance of ambulating.

## 2017-09-06 PROBLEM — N19 RENAL FAILURE: Status: ACTIVE | Noted: 2017-09-06

## 2017-09-06 PROCEDURE — 770006 HCHG ROOM/CARE - MED/SURG/GYN SEMI*

## 2017-09-06 PROCEDURE — 99233 SBSQ HOSP IP/OBS HIGH 50: CPT | Performed by: HOSPITALIST

## 2017-09-06 PROCEDURE — G8998 SWALLOW D/C STATUS: HCPCS | Mod: CL

## 2017-09-06 PROCEDURE — G8997 SWALLOW GOAL STATUS: HCPCS | Mod: CJ

## 2017-09-06 ASSESSMENT — PAIN SCALES - GENERAL
PAINLEVEL_OUTOF10: 0

## 2017-09-06 NOTE — THERAPY
Attempted to see patient for dysphagia tx session. Patient has transitioned to comfort care. SLP will d/c services per protocol. Please re-consult if needed. Thank you.

## 2017-09-06 NOTE — PROGRESS NOTES
[facility] Hospitalist Progress Note    Date of Service: 2017    Chief Complaint  89/M with HTN, transferred from [facility] for continued creatinine elevation (crea 7.29) despite IVF, causing respiratory failure and worsening of chronic cough, responding to diuretics. (+) unstageable sacral ulcer. BNP high in the . Crea 7.39. Trop elevated at 0.23, felt to be due to ALBERTO. Has been retaining urine there and saavedra placed. CXR showed multifocal consolidation. Started on antibiotics for pneumonia. Started on lasix. Nephrology consulted, gave opinion of poor prognosis as no close dialysis center near home. Crea worsened to 8.01. Na, K WNL. BNP remains high at 2805. UA showed  RBC, 30 protein. PCT high at 0.67. Renal US showed increased echogenecity B/L c/w medical renal disease, with no hydronephrosis. 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 Chest CT showed multifocal pneumonia, with reactive mediastinal adenopathy, and moderate B/L pleural effusions. Nephrology felt this was CKD. Diuresed well with IV lasix. Failed swallow eval, cortrak placed, started on TF.       Interval Problem Update  Patient remains confused  On comfort care measures only  Hospice consult placed    Consultants/Specialty  Nephrology  Palliative Care    Disposition  Monitor on telemetry. Palliative care consult.      Review of Systems   Unable to perform ROS: Dementia        Pertinent positives/negatives as mentioned above.     A complete review of systems was done. All other systems were negative.      Physical Exam  Laboratory/Imaging   Hemodynamics  Temp (24hrs), Av.3 °C (97.3 °F), Min:36.2 °C  (97.1 °F), Max:36.3 °C (97.4 °F)   Temperature:  (comfort care)  Pulse  Av.2  Min: 55  Max: 80    Blood Pressure :  (comfort care)      Respiratory      Respiration: 15, Pulse Oximetry:  (comfort care)     Work Of Breathing / Effort: Mild  RUL Breath Sounds: Clear, RML Breath Sounds: Clear, RLL Breath Sounds: Diminished, INGRIS Breath Sounds: Clear, LLL Breath Sounds: Diminished    Fluids    Intake/Output Summary (Last 24 hours) at 17 1704  Last data filed at 17 1300   Gross per 24 hour   Intake              300 ml   Output              775 ml   Net             -475 ml       Nutrition  Orders Placed This Encounter   Procedures   • DIET ORDER     Standing Status:   Standing     Number of Occurrences:   1     Order Specific Question:   Diet:     Answer:   Regular [1]     Physical Exam   Constitutional: He appears well-developed.   HENT:   Head: Normocephalic and atraumatic.   Mouth/Throat: Oropharynx is clear and moist. No oropharyngeal exudate.   Eyes: EOM are normal. Pupils are equal, round, and reactive to light. No scleral icterus.   Neck: Normal range of motion. Neck supple.   Cardiovascular: Normal rate and regular rhythm.    No murmur heard.  Pulmonary/Chest: Effort normal. No respiratory distress.   (+) bibasilar crackles   Abdominal: Soft. Bowel sounds are normal.   Musculoskeletal: Normal range of motion. He exhibits edema (anasarca). He exhibits no tenderness.   Lymphadenopathy:     He has no cervical adenopathy.   Neurological: He is alert. He is disoriented.   Skin: Skin is warm and dry. No rash noted. No erythema.   Psychiatric: He has a normal mood and affect. His behavior is normal. Judgment and thought content normal.   Nursing note and vitals reviewed.      Recent Labs      17   0230   WBC  6.3   RBC  3.72*   HEMOGLOBIN  10.8*   HEMATOCRIT  32.0*   MCV  86.0   MCH  29.0   MCHC  33.8   RDW  49.1   PLATELETCT  244   MPV  11.2     Recent Labs      17   0230  17   0255    SODIUM  142  142   POTASSIUM  4.7  5.0   CHLORIDE  110  109   CO2  14*  16*   GLUCOSE  84  89   BUN  147*  161*   CREATININE  8.01*  9.51*   CALCIUM  6.9*  7.2*         Recent Labs      09/03/17   0230   BNPBTYPENAT  2805*              Assessment/Plan     Active Problems:    Acute renal failure (ARF) (CMS-HCC) POA: Yes    Elevated brain natriuretic peptide (BNP) level POA: Yes    Troponin level elevated POA: Yes    Pneumonia, community acquired, possible aspiration pneumonia POA: Yes    Hypocalcemia, corrected to low albumin POA: Yes    Urinary retention POA: Yes    Anemia POA: Yes    Unstageable pressure ulcer of sacral region (CMS-HCC) POA: Yes    Dysphagia POA: Yes    Anasarca due to renal failure, hypoalbuminemia POA: Yes    Hypertension POA: Yes    Severe protein-calorie malnutrition, hypoalbuminemia (CMS-HCC) POA: Yes  Resolved Problems:    * No resolved hospital problems. *      -Continue with comfort care. D/C all non-comfort medications including antibiotics, and lasix. D/C cortrak and tube feeding, and continue comfort feeding.   - comfort care order set placed. Will continue PRN PO roxanol, robinul, IV morphine for comfort. Start bowel protocol.   - palliative care on-board. Will have general inpatient hospice to evaluate for appropriateness for transfer of care to their service. Anticipate mortality in <14 days.   We'll discuss with case management and social work regarding hospice at a facility      Reviewed items::  Labs reviewed, Radiology images reviewed and Medications reviewed  Gonzalez catheter::  No Gonzalez  DVT: Not indicated, comfort care.  Ulcer Prophylaxis::  Not indicated

## 2017-09-06 NOTE — PROGRESS NOTES
Assumed pt care at 1900. Bedside report received.  Pt in no distress.  Denies any chest pain or complaints.  Bed locked in lowest position, call light within reach, and fall precautions are in place.  Will continue to monitor and follow plan of care.

## 2017-09-06 NOTE — DISCHARGE PLANNING
SW received return call from Charlette with [facility] in [place]. She reports that they do not offer hospice but that they often teach family members how to care for pts and avocate for pt's needs but they can't do 24-hour hospice care. [facility] requesting a home safety eval, however pt does not have a local PCP so [facility] was unable to accept. Charlette reports that she is not aware of family for pt other than Verona (Per Charlette, Verona's  name is Verona Sagastume.) However, Verona is also elderly and would not be able to care for pt at home from Charlette's perspective. Charlette also stated that Verona spends half the year in [place] with her son and is likely there now, not in [place]. Charlette stated that there are other individuals with the last name Bashir in [place] and [place], however as far as she knows they are not related.     Charlette stated that there is a care facility in [place], [facility]. (Ph:[phone] contact: Romi) and that there is also Volunteer hospice through [facility], but that they offer the same services as [facility].     Charlette also stated that if pt were to go home, we could set him up with a local PCP by calling [phone].    So, at this time there is no actual hospice services in pt's area and he does not have a support system to help care for him at home. We continue to try to reach NOK.

## 2017-09-06 NOTE — DISCHARGE PLANNING
"SW spoke to [facility] out of [place], who states that [facility] might cover that area as they cover [place] (about 20 minutes from [place]). SW left message for [facility] office requesting call back. ([phone])  At this time, this is the only potential hospice option in pt's hometown.    Additionally, pt reports he has no family support other than a niece Verona who also lives in [place]. CTT staff has been unsuccessful in reaching Verona, the phone number provided matches [facility], however there is no voicemail. Another phone number ([phone]) was answered however the individual stated it is the wrong number.     SW searched [facility] for pt's name, found a relative listed \"Vinayak Camejo\" with the same address and phone number as pt. Attempted to call, but no answer.     MIKE will continue to attempt to locate NOK and any potential hospice services as this time.     Currently MIKE is unable to refer pt to [facility] hospice as attending MD states pt is confused and unable to consent. MIKE requested capacity eval.     "

## 2017-09-06 NOTE — PROGRESS NOTES
[facility] Hospitalist Progress Note    Date of Service: 9/6/2017    Chief Complaint  89/M with HTN, transferred from [facility] for continued creatinine elevation (crea 7.29) despite IVF, causing respiratory failure and worsening of chronic cough, responding to diuretics. (+) unstageable sacral ulcer. BNP high in the 2000s. Crea 7.39. Trop elevated at 0.23, felt to be due to ALBERTO. Has been retaining urine there and saavedra placed. CXR showed multifocal consolidation. Started on antibiotics for pneumonia. Started on lasix. Nephrology consulted, gave opinion of poor prognosis as no close dialysis center near home. Crea worsened to 8.01. Na, K WNL. BNP remains high at 2805. UA showed  RBC, 30 protein. PCT high at 0.67. Renal US showed increased echogenecity B/L c/w medical renal disease, with no hydronephrosis. 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 Chest CT showed multifocal pneumonia, with reactive mediastinal adenopathy, and moderate B/L pleural effusions. Nephrology felt this was CKD. Diuresed well with IV lasix. Failed swallow eval, cortrak placed, started on TF.       Interval Problem Update  Patient remains confused  Palliative consult will need to be placed  He has no capacity to make his own decisions  I changed him to DNR code status as there is no family and he cannot consent to comfort care measures    Consultants/Specialty  Nephrology  Palliative Care    Disposition  Transfer to medicine   Palliative care consult.      Review of Systems   Unable to perform ROS: Dementia        Pertinent positives/negatives as mentioned above.     A complete review of systems was done. All  other systems were negative.      Physical Exam  Laboratory/Imaging   Hemodynamics  Temp (24hrs), Av.2 °C (97.2 °F), Min:36 °C (96.8 °F), Max:36.4 °C (97.5 °F)   Temperature: 36.4 °C (97.5 °F)  Pulse  Av.3  Min: 55  Max: 80    Blood Pressure : 125/55      Respiratory      Respiration: 17, Pulse Oximetry: 91 %     Work Of Breathing / Effort: Mild  RUL Breath Sounds: Clear, RML Breath Sounds: Clear, RLL Breath Sounds: Diminished, INGRIS Breath Sounds: Clear, LLL Breath Sounds: Diminished    Fluids    Intake/Output Summary (Last 24 hours) at 17 1639  Last data filed at 17 1400   Gross per 24 hour   Intake              460 ml   Output             1300 ml   Net             -840 ml       Nutrition  Orders Placed This Encounter   Procedures   • DIET ORDER     Standing Status:   Standing     Number of Occurrences:   1     Order Specific Question:   Diet:     Answer:   Regular [1]     Physical Exam   Constitutional: No distress.   HENT:   Head: Normocephalic and atraumatic.   Mouth/Throat: No oropharyngeal exudate.   Eyes: Conjunctivae and EOM are normal. Pupils are equal, round, and reactive to light.   Neck: Normal range of motion. Neck supple. No JVD present.   Cardiovascular: Normal rate and regular rhythm.    No murmur heard.  Pulmonary/Chest: Effort normal and breath sounds normal. No respiratory distress.   Abdominal: Soft. Bowel sounds are normal. He exhibits no distension.   Musculoskeletal: Normal range of motion. He exhibits edema (anasarca).   Neurological: He is alert. He is disoriented.   Skin: Skin is warm and dry. He is not diaphoretic.   Psychiatric: He has a normal mood and affect. His behavior is normal. Judgment and thought content normal.   Nursing note and vitals reviewed.          Recent Labs      17   0255   SODIUM  142   POTASSIUM  5.0   CHLORIDE  109   CO2  16*   GLUCOSE  89   BUN  161*   CREATININE  9.51*   CALCIUM  7.2*                      Assessment/Plan     Active  Problems:    Acute renal failure (ARF) (CMS-HCC) POA: Yes    Elevated brain natriuretic peptide (BNP) level POA: Yes    Troponin level elevated POA: Yes    Pneumonia, community acquired, possible aspiration pneumonia POA: Yes    Hypocalcemia, corrected to low albumin POA: Yes    Urinary retention POA: Yes    Anemia POA: Yes    Unstageable pressure ulcer of sacral region (CMS-HCC) POA: Yes    Dysphagia POA: Yes    Anasarca due to renal failure, hypoalbuminemia POA: Yes    Hypertension POA: Yes    Severe protein-calorie malnutrition, hypoalbuminemia (CMS-HCC) POA: Yes    Renal failure POA: Unknown  Resolved Problems:    * No resolved hospital problems. *      We'll discuss with palliative care regarding further plan of care  He is DNR and may need to be changed over to comfort measures      Reviewed items::  Labs reviewed, Radiology images reviewed and Medications reviewed  Gonzalez catheter::  No Gonzalez  DVT: Not indicated, comfort care.  Ulcer Prophylaxis::  Not indicated

## 2017-09-06 NOTE — CARE PLAN
Problem: Safety  Goal: Will remain free from falls    Intervention: Implement fall precautions  Savita Bo Fall Risk Assessment:     Last Known Fall: Within the last month  Mobility: Use of assistive device/requires assist of two people  Medications: No meds  Mental Status/LOC/Awareness: Oriented to person and place  Toileting Needs: Use of assistive device (Bedside commode, bedpan, urinal), Use of catheters or diversion devices  Volume/Electrolyte Status: No problems  Communication/Sensory: Visual (Glasses)/hearing deficit  Behavior: Appropriate behavior  Savita Bo Fall Risk Total: 14  Fall Risk Level: MODERATE RISK    Universal Fall Precautions:  call light/belongings in reach, bed in low position and locked, siderails up x 2, wheelchairs and assistive devices out of sight, use non-slip footwear, adequate lighting, clutter free and spill free environment, educate on level of risk, educate to call for assistance    Fall Risk Level Interventions:    TRIAL (TELE 8, NEURO, MED MARIMAR 5) Moderate Fall Risk Interventions  Place yellow fall risk ID band on patient: verified  Provide patient/family education based on risk assessment : completed  Educate patient/family to call staff for assistance when getting out of bed: completed  Place fall precaution signage outside patient door: verified  Utilize bed/chair fall alarm: verifiedTRIAL (TELE 8, NEURO, MED MARIMAR 5) High Fall Risk Interventions  Place yellow fall risk ID band on patient: verified  Provide patient/family education based on risk assessment: completed  Educate patient/family to call staff for assistance when getting out of bed: completed  Place fall precaution signage outside patient door: verified  Place patient in room close to nursing station: verified  Utilize bed/chair fall alarm: verified  Notify charge of high risk for huddle: completed    Patient Specific Interventions:     Medication: review medications with patient and family  Mental  Status/LOC/Awareness: reorient patient, reinforce falls education, check on patient hourly, utilize bed/chair fall alarm and reinforce the use of call light  Toileting: provide frquent toileting  Volume/Electrolyte Status: ensure patient remains hydrated and monitor abnormal lab values  Communication/Sensory: update plan of care on whiteboard  Behavioral: encourage patient to voice feelings, engage patient in daily activities and administer medication as ordered  Mobility: utilize bed/chair fall alarm and ensure bed is locked and in lowest position      Problem: Skin Integrity  Goal: Risk for impaired skin integrity will decrease  Outcome: PROGRESSING AS EXPECTED  Pt has been turned as tolerated, also on waffle mattress

## 2017-09-06 NOTE — CARE PLAN
Problem: Safety  Goal: Will remain free from injury  Outcome: PROGRESSING AS EXPECTED  Safety measures maintained    Problem: Knowledge Deficit  Goal: Knowledge of disease process/condition, treatment plan, diagnostic tests, and medications will improve  Outcome: PROGRESSING AS EXPECTED  Pt educated on comfort care

## 2017-09-07 ENCOUNTER — HOSPICE ADMISSION (OUTPATIENT)
Dept: HOSPICE | Facility: HOSPICE | Age: 82
End: 2017-09-07
Payer: MEDICARE

## 2017-09-07 ENCOUNTER — HOME CARE VISIT (OUTPATIENT)
Dept: HOSPICE | Facility: HOSPICE | Age: 82
End: 2017-09-07
Payer: MEDICARE

## 2017-09-07 PROCEDURE — 700102 HCHG RX REV CODE 250 W/ 637 OVERRIDE(OP): Performed by: INTERNAL MEDICINE

## 2017-09-07 PROCEDURE — 99232 SBSQ HOSP IP/OBS MODERATE 35: CPT | Performed by: INTERNAL MEDICINE

## 2017-09-07 PROCEDURE — A9270 NON-COVERED ITEM OR SERVICE: HCPCS | Performed by: INTERNAL MEDICINE

## 2017-09-07 PROCEDURE — 770006 HCHG ROOM/CARE - MED/SURG/GYN SEMI*

## 2017-09-07 ASSESSMENT — PAIN SCALES - GENERAL
PAINLEVEL_OUTOF10: 0

## 2017-09-07 NOTE — DISCHARGE PLANNING
Received choice form from MIKE Romero for hospice services, referral has been sent to [facility] per patient request.

## 2017-09-07 NOTE — PROGRESS NOTES
Received report from night RN, assumed care at 0700. Pt A&OX3, re-oriented to situation. Pt's nephew at bedside. Q2H turns in place, mepilex to coccyx, CDI. Pt with saavedra for retention. Bed alarm in use, bed in lowest and locked position, non-skid socks in place. POC discussed, communication board updated. Call light in reach, hourly rounding in place.

## 2017-09-07 NOTE — DISCHARGE PLANNING
Medical Social Work     SW faxed [facility] inpatient hospice choice form. Pt choice is [facility] inpatient [facility].

## 2017-09-07 NOTE — PROGRESS NOTES
[facility] Hospitalist Progress Note    Date of Service: 2017    Chief Complaint  89 y.o. male admitted 2017 with renal failure    Interval Problem Update  Sleepy but arousable and appropriate; denies pain/problems.    Consultants/Specialty  Nephro/palliative care    Disposition  hospice4        ROS   Physical Exam  Laboratory/Imaging   Hemodynamics  Temp (24hrs), Av.3 °C (97.4 °F), Min:36.1 °C (97 °F), Max:36.4 °C (97.6 °F)   Temperature: 36.1 °C (97 °F)  Pulse  Av  Min: 55  Max: 80    Blood Pressure : 130/58      Respiratory      Respiration: 16, Pulse Oximetry: 91 %     Work Of Breathing / Effort: Mild  RUL Breath Sounds: Clear, RML Breath Sounds: Clear, RLL Breath Sounds: Diminished, INGRIS Breath Sounds: Clear, LLL Breath Sounds: Diminished    Fluids    Intake/Output Summary (Last 24 hours) at 17 1246  Last data filed at 17 0900   Gross per 24 hour   Intake              560 ml   Output              900 ml   Net             -340 ml       Nutrition  Orders Placed This Encounter   Procedures   • DIET ORDER     Standing Status:   Standing     Number of Occurrences:   1     Order Specific Question:   Diet:     Answer:   Regular [1]     Physical Exam                             Assessment/Plan     ESRD now on comfort care; palliative care has seen patient on  when he requested/agreed to comfort care.  Patient is somnolent now but arousable and still oriented x 3.  Denies pain/problems.  Cont with comfort care.  Family came to visit this am for the first time but has left before meeting with me.  I've asked palliative care team to contact them to have a meeting with me to discuss patient's condition/care plan.  Quality-Core Measures

## 2017-09-07 NOTE — DISCHARGE PLANNING
Medical Social Work     SW called and left a VM for pt daughter requesting a call back in regards to pt discharge planning.

## 2017-09-07 NOTE — PALLIATIVE CARE
Palliative Care follow-up  Call received from MD with uncertain understanding of the plan of care given the recent change from comfort care to DNR. Updates provided to Dr. Silva on PC involvement and he plans to reach out to current MD to update on plan of care. Updates provided to BS RN.      Plan: return to comfort care    Thank you for allowing Palliative Care to participate in this patient's care. Please feel free to call [phone] with any questions or concerns.

## 2017-09-07 NOTE — CARE PLAN
Problem: Skin Integrity  Goal: Risk for impaired skin integrity will decrease  Outcome: PROGRESSING AS EXPECTED  2 RN skin check upon arrival to unit. Patient on waffle overlay. Q2 turns in place. Incontinence care as needed by staff. Mepilex replaced to buttock.    Problem: Pain Management  Goal: Pain level will decrease to patient's comfort goal  Outcome: PROGRESSING AS EXPECTED  Patient declines pain, PRN medication available when needed.

## 2017-09-07 NOTE — PALLIATIVE CARE
PALLIATIVE CARE:  Received call from MIKE Mcdonough requesting stating that family is present and has not been and requesting palliative care meet with family. Discussed that primary PC RN not present today. Reviewed EPIC. POLST completed by patient, primary PC RN Imer,and Dr. Flores 9/4.     Discussed with hospitalist SOL Urrutia, Dr. Dillard, MIKE Mcdonough, and pharmacy. Per pharmacy patient was comfort care 9/4 but now DNR only as of 9/6. Patient unfortunately now unable to communicate per Dr. Dillard. Care team to address after rounds. Palliative care offered assistance and requested care team to call with needs.    Thank you for allowing Palliative Care to follow this patient. Please contact us at  with any questions.

## 2017-09-07 NOTE — PROGRESS NOTES
Patient arrived to unit at 1915 in bed. Patient oriented to self and place. Reorientation provided.  Updated on POC, verbalizes understanding. Incontinent of stool this evening, incontinence care provided by staff. Gonzalez catheter in place. Patient able to tolerate water. Declines pain. Waffle overlay in place, q2 turns implemented. 2 RN skin check completed with Ti and mepilex replaced to sacrum. Educated on calling for assistance, call light in reach, rounding in place.

## 2017-09-07 NOTE — CARE PLAN
Problem: Safety  Goal: Will remain free from falls    Intervention: Implement fall precautions  Bed alarm in use, bed in lowest and locked position, non-skid socks in place, call light in reach, hourly rounding in place.      Problem: Knowledge Deficit  Goal: Knowledge of disease process/condition, treatment plan, diagnostic tests, and medications will improve    Intervention: Explain information regarding disease process/condition, treatment plan, diagnostic tests, and medications and document in education  Patient educated about medication and rationale for prescribed medication.

## 2017-09-07 NOTE — DISCHARGE PLANNING
Medical Social Work     SW spoke with pt at bedside and pt was A&OX3 at the time. SW asked pt if he would be open to discharging to a SNF and he stated yes he would be open to a SNF but in the area he lived in. Pt lives in [place], [place].     Plan: Send out a SNF referral for the [place], [place] area. SW is also waiting for the pt daughter to call back as she would be the NOK.

## 2017-09-07 NOTE — PROGRESS NOTES
Savita Bo Fall Risk Assessment:     Last Known Fall: Within the last month  Mobility: Use of assistive device/requires assist of two people  Medications: No meds  Mental Status/LOC/Awareness: Oriented to person and place  Toileting Needs: Use of catheters or diversion devices  Volume/Electrolyte Status: No problems  Communication/Sensory: Visual (Glasses)/hearing deficit  Behavior: Appropriate behavior  Savita Bo Fall Risk Total: 12  Fall Risk Level: MODERATE RISK    Universal Fall Precautions:  call light/belongings in reach, bed in low position and locked, wheelchairs and assistive devices out of sight, siderails up x 2, use non-slip footwear, adequate lighting, clutter free and spill free environment, educate on level of risk, educate to call for assistance    Fall Risk Level Interventions:    TRIAL ([employer] 8, NEURO, MED MARIMAR 5) Moderate Fall Risk Interventions  Place yellow fall risk ID band on patient: verified  Provide patient/family education based on risk assessment : completed  Educate patient/family to call staff for assistance when getting out of bed: completed  Place fall precaution signage outside patient door: completed  Utilize bed/chair fall alarm: verifiedTRIAL (TELE 8, NEURO, Powered MARIMAR 5) High Fall Risk Interventions  Place yellow fall risk ID band on patient: verified  Provide patient/family education based on risk assessment: completed  Educate patient/family to call staff for assistance when getting out of bed: completed  Place fall precaution signage outside patient door: verified  Place patient in room close to nursing station: verified  Utilize bed/chair fall alarm: verified  Notify charge of high risk for huddle: completed    Patient Specific Interventions:     Medication: review medications with patient and family, assess for medications that can be discontinued or dosage decreased, limit combination of prn medications and assess need for orthostatic hypotension evaluation  Mental  Status/LOC/Awareness: reorient patient, reinforce falls education, encourage family to stay with patient, check on patient hourly, utilize bed/chair fall alarm, reinforce the use of call light and provide activity  Toileting: provide frquent toileting, monitor intake and output/use of appropriate interventions and instruct male patients prone to dizziness to void while sitting  Volume/Electrolyte Status: ensure patient remains hydrated, monitor abnormal lab values and teach patients to dangle before rising if hypotensive  Communication/Sensory: update plan of care on whiteboard, ensure proper positioning when transferrng/ambulating and ensure patient has glasses/contacts and hearing aids/dentures  Behavioral: encourage patient to voice feelings, engage patient in daily activities, administer medication as ordered, instruct/reinforce fall program rationale and encourage family to stay with impulsive patients  Mobility: schedule physical activity throughout the day, provide comfort measures during transport, dangle prior to standing, utilize bed/chair fall alarm, ensure bed is locked and in lowest position, provide appropriate assistive device, instruct patient to exit bed on their strongest side and collaborate with doctor for possible PT/OT consult

## 2017-09-07 NOTE — DISCHARGE PLANNING
Medical Social Work     SW received a call from Marycruzmalena Rodriguez who is the pt sister. Marycruz stated that she is the NOK and the pt does not have any children. Marycruz also stated that her and the pt are estranged and she is giving permission for Brennon Engle [phone] who is the pt nephew to make all medical decisions regarding the pt if the pt cannot make his own decsions.      Plan: MIKE will follow up with Bhavesh Engle at [phone] regarding NOK/POA.

## 2017-09-07 NOTE — PROGRESS NOTES
Savita Bo Fall Risk Assessment:     Last Known Fall: Within the last month  Mobility: Use of assistive device/requires assist of two people  Medications: No meds  Mental Status/LOC/Awareness: Oriented to person and place  Toileting Needs: Incontinence  Volume/Electrolyte Status: No problems  Communication/Sensory: Visual (Glasses)/hearing deficit  Behavior: Appropriate behavior  Savita Bo Fall Risk Total: 14  Fall Risk Level: MODERATE RISK    Universal Fall Precautions:  call light/belongings in reach, bed in low position and locked, wheelchairs and assistive devices out of sight, siderails up x 2, use non-slip footwear, adequate lighting, clutter free and spill free environment, educate on level of risk, educate to call for assistance    Fall Risk Level Interventions:    TRIAL (TELE 8, NEURO, MED MARIMAR 5) Moderate Fall Risk Interventions  Place yellow fall risk ID band on patient: completed  Provide patient/family education based on risk assessment : completed  Educate patient/family to call staff for assistance when getting out of bed: completed  Place fall precaution signage outside patient door: completed  Utilize bed/chair fall alarm: completedTRIAL (TELE 8, NEURO, [employer] MARIMAR 5) High Fall Risk Interventions  Place yellow fall risk ID band on patient: verified  Provide patient/family education based on risk assessment: completed  Educate patient/family to call staff for assistance when getting out of bed: completed  Place fall precaution signage outside patient door: verified  Place patient in room close to nursing station: verified  Utilize bed/chair fall alarm: verified  Notify charge of high risk for huddle: completed    Patient Specific Interventions:     Medication: review medications with patient and family  Mental Status/LOC/Awareness: reorient patient, reinforce falls education, check on patient hourly, utilize bed/chair fall alarm and reinforce the use of call light  Toileting: provide frquent  toileting and instruct patient/family on the need to call for assistance when toileting  Volume/Electrolyte Status: ensure patient remains hydrated  Communication/Sensory: update plan of care on whiteboard and have patients with hearing deficit repeat information back to you to ensure proper understanding  Behavioral: engage patient in daily activities, administer medication as ordered and instruct/reinforce fall program rationale  Mobility: utilize bed/chair fall alarm and ensure bed is locked and in lowest position

## 2017-09-08 ENCOUNTER — HOME CARE VISIT (OUTPATIENT)
Dept: HOSPICE | Facility: HOSPICE | Age: 82
End: 2017-09-08
Payer: MEDICARE

## 2017-09-08 PROCEDURE — 99232 SBSQ HOSP IP/OBS MODERATE 35: CPT | Performed by: INTERNAL MEDICINE

## 2017-09-08 PROCEDURE — 770006 HCHG ROOM/CARE - MED/SURG/GYN SEMI*

## 2017-09-08 RX ORDER — ATROPINE SULFATE 10 MG/ML
2 SOLUTION/ DROPS OPHTHALMIC EVERY 4 HOURS PRN
Status: DISCONTINUED | OUTPATIENT
Start: 2017-09-08 | End: 2017-09-14 | Stop reason: HOSPADM

## 2017-09-08 ASSESSMENT — PAIN SCALES - GENERAL
PAINLEVEL_OUTOF10: 0

## 2017-09-08 NOTE — CARE PLAN
Problem: Safety  Goal: Will remain free from falls    Intervention: Implement fall precautions  Bed in lowest and locked position, bed alarm in use, non-skid socks in place.      Problem: Skin Integrity  Goal: Risk for impaired skin integrity will decrease    Intervention: Implement precautions to protect skin integrity in collaboration with the interdisciplinary team  Waffle overlay in place, Q2H turns in place, mepilex to sacrum.

## 2017-09-08 NOTE — PROGRESS NOTES
[facility] Hospitalist Progress Note    Date of Service: 2017    Chief Complaint  89 y.o. male admitted 2017 with renal failure    Interval Problem Update  More lethargic today but arousable, MS not as good as yesterday.    Consultants/Specialty  Nephro/palliative care    Disposition  hospice        ROS     Physical Exam  Laboratory/Imaging   Hemodynamics  Temp (24hrs), Av.5 °C (97.7 °F), Min:35.9 °C (96.7 °F), Max:36.9 °C (98.4 °F)   Temperature: 36.9 °C (98.4 °F)  Pulse  Av.5  Min: 55  Max: 96    Blood Pressure : 133/57      Respiratory      Respiration: 17, Pulse Oximetry: 90 %     Work Of Breathing / Effort: Mild  RUL Breath Sounds: Clear, RML Breath Sounds: Clear, RLL Breath Sounds: Diminished, INGRIS Breath Sounds: Clear, LLL Breath Sounds: Diminished    Fluids    Intake/Output Summary (Last 24 hours) at 17 1133  Last data filed at 17 0600   Gross per 24 hour   Intake              120 ml   Output             1300 ml   Net            -1180 ml       Nutrition  Orders Placed This Encounter   Procedures   • DIET ORDER     Standing Status:   Standing     Number of Occurrences:   1     Order Specific Question:   Diet:     Answer:   Regular [1]     Physical Exam                               Assessment/Plan     ESRD now on comfort care; palliative care has seen patient on  when he requested/agreed to comfort care.  Patient is lethargic but arousable and denies pain.  Appropriate for IP hospice, awaiting consult.  I spoke with patient's nephew yesterday, next of kin, and informed him of patient's condition, prognosis, and care plan.  Nephew voiced understanding and agreed with current care plan.  Quality-Core Measures

## 2017-09-08 NOTE — PROGRESS NOTES
Family was at bedside. Iron (nephew), Brennon (nephew), Carmen (Brennon's wife). Updated family on pt's plan of care. All of family members phone numbers have been updated in the [facility].

## 2017-09-08 NOTE — CARE PLAN
Problem: Communication  Goal: The ability to communicate needs accurately and effectively will improve    Intervention: [facility] patient and significant other/support system to call light to alert staff of needs  Updated pt and pt's family at bedside, explained plan of care. Pt's family discussed possible D/C options. Family member phone numbers have been updated, as well as relationships to pt (pt has no kids, only niece, nephews, sisters).       Problem: Bowel/Gastric:  Goal: Will not experience complications related to bowel motility    Intervention: Assess baseline bowel pattern  Pt declined stool softener for the evening. Has scheduled stool softener in the AM again.

## 2017-09-08 NOTE — PALLIATIVE CARE
Palliative Care follow-up  Visited with patient at bedside this am.  Patient awoke to name, oriented x 4 (person, place, time and event).  He requested that I fix his pillow and remove his breakfast tray.  Denied any further needs at this time.  Hospice referred and involved      Updated: Spoke with Yessenia    Plan: Continue to monitor.  Collaboration with Hospice staff.      Thank you for allowing Palliative Care to participate in this patient's care. Please feel free to call [phone] with any questions or concerns.

## 2017-09-08 NOTE — PROGRESS NOTES
Rec'd report from day shift RN. Assumed pt care. Assessment completed. AA&OX3, reoriented to event. Denies pain at this time. No s/s of discomfort or distress. Q2 turns enforced. Dependent edema to BLE. Gonzalez draining to gravity. Bed in lowest position, bed locked, bed alarm on for safety, treaded socks in place, RN and CNA numbers provided, call light within reach.

## 2017-09-08 NOTE — PROGRESS NOTES
Received report from night RN, assumed care at 0700. Pt A&OX3, re-oriented to event. Pt resting in bed, no signs/symptoms of distress. Bed alarm in use, bed in lowest and locked position, non-skid socks in place. Gonzalez in place, draining to gravity. Q2H turns in place. POC discussed, communication board updated. Call light in reach, hourly rounding in place.

## 2017-09-08 NOTE — DISCHARGE PLANNING
MIKE met with Hospice RN regarding the care of this pt.     Pt is not imminent and is eating well at this time and therefore not a candidate for inpatient hospice.     [facility] has offered to help with placement of this individual,  and [facility] are being considered.   MIKE will remain actively involved in d/c planning for this pt.

## 2017-09-08 NOTE — PROGRESS NOTES
Savita Bo Fall Risk Assessment:     Last Known Fall: Within the last month  Mobility: Use of assistive device/requires assist of two people  Medications: No meds  Mental Status/LOC/Awareness: Oriented to person and place  Toileting Needs: Use of catheters or diversion devices  Volume/Electrolyte Status: No problems  Communication/Sensory: Visual (Glasses)/hearing deficit  Behavior: Appropriate behavior  Savita Bo Fall Risk Total: 12  Fall Risk Level: MODERATE RISK    Universal Fall Precautions:  call light/belongings in reach, bed in low position and locked, wheelchairs and assistive devices out of sight, siderails up x 2, use non-slip footwear, adequate lighting, clutter free and spill free environment, educate on level of risk, educate to call for assistance    Fall Risk Level Interventions:    TRIAL ([employer] 8, NEURO, MED MARIMAR 5) Moderate Fall Risk Interventions  Place yellow fall risk ID band on patient: verified  Provide patient/family education based on risk assessment : completed  Educate patient/family to call staff for assistance when getting out of bed: completed  Place fall precaution signage outside patient door: verified  Utilize bed/chair fall alarm: verifiedTRIAL ([employer] 8, NEURO, [employer] MARIMAR 5) High Fall Risk Interventions  Place yellow fall risk ID band on patient: verified  Provide patient/family education based on risk assessment: completed  Educate patient/family to call staff for assistance when getting out of bed: completed  Place fall precaution signage outside patient door: verified  Place patient in room close to nursing station: verified  Utilize bed/chair fall alarm: verified  Notify charge of high risk for huddle: completed    Patient Specific Interventions:     Medication: review medications with patient and family, assess for medications that can be discontinued or dosage decreased and assess need for orthostatic hypotension evaluation  Mental Status/LOC/Awareness: reorient patient,  reinforce falls education, encourage family to stay with patient, check on patient hourly, utilize bed/chair fall alarm, reinforce the use of call light and provide activity  Toileting: provide frquent toileting, monitor intake and output/use of appropriate interventions, instruct male patients prone to dizziness to void while sitting and instruct patient/family on the use of grab bars  Volume/Electrolyte Status: ensure patient remains hydrated  Communication/Sensory: update plan of care on whiteboard  Behavioral: encourage patient to voice feelings, engage patient in daily activities and administer medication as ordered  Mobility: utilize bed/chair fall alarm and ensure bed is locked and in lowest position

## 2017-09-08 NOTE — PROGRESS NOTES
Savita Bo Fall Risk Assessment:     Last Known Fall: Within the last month  Mobility: Use of assistive device/requires assist of two people  Medications: No meds  Mental Status/LOC/Awareness: Oriented to person and place  Toileting Needs: Use of catheters or diversion devices  Volume/Electrolyte Status: No problems  Communication/Sensory: Visual (Glasses)/hearing deficit  Behavior: Appropriate behavior  Savita Bo Fall Risk Total: 12  Fall Risk Level: MODERATE RISK    Universal Fall Precautions:  call light/belongings in reach, bed in low position and locked, wheelchairs and assistive devices out of sight, siderails up x 2, use non-slip footwear, adequate lighting, clutter free and spill free environment, educate on level of risk, educate to call for assistance    Fall Risk Level Interventions:    TRIAL ([employer] 8, NEURO, MED MARIMAR 5) Moderate Fall Risk Interventions  Place yellow fall risk ID band on patient: verified  Provide patient/family education based on risk assessment : completed  Educate patient/family to call staff for assistance when getting out of bed: completed  Place fall precaution signage outside patient door: verified  Utilize bed/chair fall alarm: verifiedTRIAL ([employer] 8, NEURO, [employer] MARIMAR 5) High Fall Risk Interventions  Place yellow fall risk ID band on patient: verified  Provide patient/family education based on risk assessment: completed  Educate patient/family to call staff for assistance when getting out of bed: completed  Place fall precaution signage outside patient door: verified  Place patient in room close to nursing station: verified  Utilize bed/chair fall alarm: verified  Notify charge of high risk for huddle: completed    Patient Specific Interventions:     Medication: review medications with patient and family  Mental Status/LOC/Awareness: reorient patient, reinforce falls education, check on patient hourly, utilize bed/chair fall alarm and reinforce the use of call  light  Toileting: monitor intake and output/use of appropriate interventions, instruct patient/family on the need to call for assistance when toileting, do not leave patient unattended in bathroom/refer to toileting scripting and toilet prior to giving pain medications  Volume/Electrolyte Status: monitor abnormal lab values  Communication/Sensory: update plan of care on whiteboard  Behavioral: encourage patient to voice feelings and instruct/reinforce fall program rationale  Mobility: utilize bed/chair fall alarm and ensure bed is locked and in lowest position

## 2017-09-09 PROCEDURE — 99232 SBSQ HOSP IP/OBS MODERATE 35: CPT | Performed by: INTERNAL MEDICINE

## 2017-09-09 PROCEDURE — 770006 HCHG ROOM/CARE - MED/SURG/GYN SEMI*

## 2017-09-09 ASSESSMENT — PAIN SCALES - GENERAL
PAINLEVEL_OUTOF10: 0
PAINLEVEL_OUTOF10: 0

## 2017-09-09 ASSESSMENT — ENCOUNTER SYMPTOMS
COUGH: 0
BLURRED VISION: 0
HEARTBURN: 0
NECK PAIN: 1
DIZZINESS: 0
MYALGIAS: 0

## 2017-09-09 NOTE — PROGRESS NOTES
Received report from ongoing nurse, pt is PHOEBE, YONASOx3, reoriented to situation, no co of pain. Q2 turns in place, pt ate 25% of dinner, refused snacks, offered fluids. Fall measures in place. Call light within reach, personal belongings close-by, bed in lowest position, IV pole on same side of bathroom, upper bed rails up, hourly rounding in place. Will continue to monitor pt for safety.

## 2017-09-09 NOTE — CARE PLAN
Problem: Safety  Goal: Will remain free from injury  Outcome: PROGRESSING AS EXPECTED  Bed on lowest position, call light within reach, patient educated about use of call light and safety precautions.     Problem: Pain Management  Goal: Pain level will decrease to patient's comfort goal  Outcome: PROGRESSING AS EXPECTED  Patient reports 0/10 pain. Repositioning and pillows used as non pharmacological methods.

## 2017-09-09 NOTE — PROGRESS NOTES
Savita Bo Fall Risk Assessment:     Last Known Fall: Within the last month  Mobility: Use of assistive device/requires assist of two people  Medications: No meds  Mental Status/LOC/Awareness: Oriented to person and place  Toileting Needs: Use of catheters or diversion devices  Volume/Electrolyte Status: No problems  Communication/Sensory: Visual (Glasses)/hearing deficit  Behavior: Appropriate behavior  Savita Bo Fall Risk Total: 12  Fall Risk Level: MODERATE RISK    Universal Fall Precautions:  call light/belongings in reach, bed in low position and locked, wheelchairs and assistive devices out of sight, siderails up x 2, use non-slip footwear, adequate lighting, clutter free and spill free environment, educate on level of risk, educate to call for assistance    Fall Risk Level Interventions:    TRIAL ([employer] 8, NEURO, MED MARIMAR 5) Moderate Fall Risk Interventions  Place yellow fall risk ID band on patient: verified  Provide patient/family education based on risk assessment : completed  Educate patient/family to call staff for assistance when getting out of bed: completed  Place fall precaution signage outside patient door: verified  Utilize bed/chair fall alarm: verifiedTRIAL ([employer] 8, NEURO, [employer] MARIMAR 5) High Fall Risk Interventions  Place yellow fall risk ID band on patient: verified  Provide patient/family education based on risk assessment: completed  Educate patient/family to call staff for assistance when getting out of bed: completed  Place fall precaution signage outside patient door: verified  Place patient in room close to nursing station: verified  Utilize bed/chair fall alarm: verified  Notify charge of high risk for huddle: completed    Patient Specific Interventions:     Medication: review medications with patient and family, assess for medications that can be discontinued or dosage decreased and assess need for orthostatic hypotension evaluation  Mental Status/LOC/Awareness: reorient patient,  reinforce falls education, encourage family to stay with patient, check on patient hourly, utilize bed/chair fall alarm, reinforce the use of call light and provide activity  Toileting: provide frquent toileting, monitor intake and output/use of appropriate interventions, instruct male patients prone to dizziness to void while sitting and instruct patient/family on the use of grab bars  Volume/Electrolyte Status: ensure patient remains hydrated  Communication/Sensory: update plan of care on whiteboard  Behavioral: encourage patient to voice feelings, engage patient in daily activities and administer medication as ordered  Mobility: utilize bed/chair fall alarm and ensure bed is locked and in lowest position

## 2017-09-09 NOTE — PROGRESS NOTES
Per Brennon Porter (nephew), the pt has [facility] and would like to know options that the [facility] may provide in regards to outpatient hospice. Will pass information down to noc RN.

## 2017-09-09 NOTE — PROGRESS NOTES
[facility] Hospitalist Progress Note    Date of Service: 2017    Chief Complaint  89 y.o. male admitted 2017 with ESRD, now on comfort care    Interval Problem Update  C/o neck discomfort due to positioning in bed;  O/w no new c/o.    Consultants/Specialty  Palliative care    Disposition  hospice        Review of Systems   Eyes: Negative for blurred vision.   Respiratory: Negative for cough.    Cardiovascular: Negative for chest pain.   Gastrointestinal: Negative for heartburn.   Genitourinary: Negative for dysuria.   Musculoskeletal: Positive for neck pain. Negative for myalgias.   Neurological: Negative for dizziness.      Physical Exam  Laboratory/Imaging   Hemodynamics  Temp (24hrs), Av.4 °C (97.6 °F), Min:36.3 °C (97.4 °F), Max:36.6 °C (97.9 °F)   Temperature: 36.3 °C (97.4 °F)  Pulse  Av.4  Min: 55  Max: 96    Blood Pressure : 135/67      Respiratory      Respiration: 18, Pulse Oximetry: 92 %     Work Of Breathing / Effort: Mild  RUL Breath Sounds: Clear, RML Breath Sounds: Clear, RLL Breath Sounds: Diminished, INGRIS Breath Sounds: Clear, LLL Breath Sounds: Diminished    Fluids    Intake/Output Summary (Last 24 hours) at 17 0928  Last data filed at 17 1300   Gross per 24 hour   Intake              100 ml   Output                0 ml   Net              100 ml       Nutrition  Orders Placed This Encounter   Procedures   • DIET ORDER     Standing Status:   Standing     Number of Occurrences:   1     Order Specific Question:   Diet:     Answer:   Regular [1]     Physical Exam   Constitutional: No distress.   HENT:   Head: Normocephalic.   Eyes: EOM are normal.   Neck: Neck supple.   Cardiovascular: Normal rate and regular rhythm.    Pulmonary/Chest: Effort normal and breath sounds normal.   Abdominal: Soft. Bowel sounds are normal. He exhibits no distension. There is no tenderness.   Musculoskeletal: He exhibits no edema.   Neurological: He is alert.   Skin: Skin is warm.                                 Assessment/Plan     ESRD, now on comfort care: stable; cont current care.  Quality-Core Measures

## 2017-09-09 NOTE — PROGRESS NOTES
"Assumed care at 0700. Received report from RN. Patient is AOx3. Patient on comfort care. Assessment complete. Labs reviewed. Patient and RN discussed plan of care. Patient questions answered. Patient needs are met at this time. Bed in lowest and locked position. Call light is within reach. Hourly rounding in place. /51   Pulse 77   Temp 36.4 °C (97.6 °F)   Resp 18   Ht 1.829 m (6' 0.01\")   Wt 78.6 kg (173 lb 4.5 oz)   SpO2 90%   BMI 23.50 kg/m²       "

## 2017-09-10 PROCEDURE — A9270 NON-COVERED ITEM OR SERVICE: HCPCS | Performed by: INTERNAL MEDICINE

## 2017-09-10 PROCEDURE — 700102 HCHG RX REV CODE 250 W/ 637 OVERRIDE(OP): Performed by: INTERNAL MEDICINE

## 2017-09-10 PROCEDURE — 99232 SBSQ HOSP IP/OBS MODERATE 35: CPT | Performed by: INTERNAL MEDICINE

## 2017-09-10 PROCEDURE — 770006 HCHG ROOM/CARE - MED/SURG/GYN SEMI*

## 2017-09-10 RX ADMIN — MORPHINE SULFATE 10 MG: 100 SOLUTION ORAL at 09:55

## 2017-09-10 ASSESSMENT — PAIN SCALES - GENERAL
PAINLEVEL_OUTOF10: 0
PAINLEVEL_OUTOF10: 4
PAINLEVEL_OUTOF10: 5

## 2017-09-10 ASSESSMENT — ENCOUNTER SYMPTOMS
HEARTBURN: 0
MYALGIAS: 0
NECK PAIN: 0
BLURRED VISION: 0
DIZZINESS: 0
COUGH: 0

## 2017-09-10 NOTE — PROGRESS NOTES
Savita Bo Fall Risk Assessment:     Last Known Fall: Within the last month  Mobility: Immobilized/requires assist of one person  Medications: No meds  Mental Status/LOC/Awareness: Oriented to person and place  Toileting Needs: Use of catheters or diversion devices  Volume/Electrolyte Status: No problems  Communication/Sensory: Visual (Glasses)/hearing deficit  Behavior: Appropriate behavior  Barney Anupam Fall Risk Total: 11  Fall Risk Level: MODERATE RISK    Universal Fall Precautions:  call light/belongings in reach, bed in low position and locked, siderails up x 2, use non-slip footwear, adequate lighting, clutter free and spill free environment    Fall Risk Level Interventions:    TRIAL ([employer] 8, NEURO, MED MARIMAR 5) Moderate Fall Risk Interventions  Place yellow fall risk ID band on patient: verified  Provide patient/family education based on risk assessment : completed  Educate patient/family to call staff for assistance when getting out of bed: completed  Place fall precaution signage outside patient door: verified  Utilize bed/chair fall alarm: verifiedTRIAL ([employer]LE 8, NEURO, [employer] MARIMAR 5) High Fall Risk Interventions  Place yellow fall risk ID band on patient: verified  Provide patient/family education based on risk assessment: completed  Educate patient/family to call staff for assistance when getting out of bed: completed  Place fall precaution signage outside patient door: verified  Place patient in room close to nursing station: verified  Utilize bed/chair fall alarm: verified  Notify charge of high risk for huddle: completed    Patient Specific Interventions:     Medication: limit combination of prn medications  Mental Status/LOC/Awareness: reorient patient, check on patient hourly, utilize bed/chair fall alarm and reinforce the use of call light  Toileting: monitor intake and output/use of appropriate interventions  Volume/Electrolyte Status: ensure patient remains hydrated, administer medications as  ordered for nausea and vomiting and monitor abnormal lab values  Communication/Sensory: update plan of care on whiteboard and ensure proper positioning when transferrng/ambulating  Behavioral: administer medication as ordered  Mobility: utilize bed/chair fall alarm and ensure bed is locked and in lowest position

## 2017-09-10 NOTE — PROGRESS NOTES
[facility] Hospitalist Progress Note    Date of Service: 9/10/2017    Chief Complaint  89 y.o. male admitted 2017 with ESRD, now on comfort care    Interval Problem Update  C/o chest pain, unknown duration.  No sob or other c/o.    Consultants/Specialty  Palliative care    Disposition  hospice        Review of Systems   Eyes: Negative for blurred vision.   Respiratory: Negative for cough.    Cardiovascular: Positive for chest pain.   Gastrointestinal: Negative for heartburn.   Genitourinary: Negative for dysuria.   Musculoskeletal: Negative for myalgias and neck pain.   Neurological: Negative for dizziness.      Physical Exam  Laboratory/Imaging   Hemodynamics  Temp (24hrs), Av.5 °C (97.7 °F), Min:36.4 °C (97.5 °F), Max:36.6 °C (97.8 °F)   Temperature: 36.6 °C (97.8 °F)  Pulse  Av.7  Min: 55  Max: 96    Blood Pressure : 148/53      Respiratory      Respiration: 17, Pulse Oximetry: 92 %     Work Of Breathing / Effort: Mild  RUL Breath Sounds: Clear, RML Breath Sounds: Diminished, RLL Breath Sounds: Diminished, INGRIS Breath Sounds: Clear, LLL Breath Sounds: Diminished    Fluids    Intake/Output Summary (Last 24 hours) at 09/10/17 1011  Last data filed at 17 1536   Gross per 24 hour   Intake              140 ml   Output             1250 ml   Net            -1110 ml       Nutrition  Orders Placed This Encounter   Procedures   • DIET ORDER     Standing Status:   Standing     Number of Occurrences:   1     Order Specific Question:   Diet:     Answer:   Regular [1]     Physical Exam   Constitutional: No distress.   HENT:   Head: Normocephalic.   Eyes: EOM are normal.   Neck: Neck supple.   Cardiovascular: Normal rate and regular rhythm.    Pulmonary/Chest: Effort normal and breath sounds normal.   Abdominal: Soft. Bowel sounds are normal. He exhibits no distension. There is no tenderness.   Musculoskeletal: He exhibits no edema.   Neurological: He is alert.   Skin: Skin is warm.                                 Assessment/Plan     ESRD, now on comfort care: stable; cont current care. Pain meds PRN, discussed with RN.  Quality-Core Measures

## 2017-09-10 NOTE — PROGRESS NOTES
Savita Bo Fall Risk Assessment:     Last Known Fall: Within the last month  Mobility: Use of assistive device/requires assist of two people  Medications: No meds  Mental Status/LOC/Awareness: Oriented to person and place  Toileting Needs: Use of catheters or diversion devices  Volume/Electrolyte Status: No problems  Communication/Sensory: Visual (Glasses)/hearing deficit  Behavior: Appropriate behavior  Savita Bo Fall Risk Total: 12  Fall Risk Level: MODERATE RISK     Universal Fall Precautions:  call light/belongings in reach, bed in low position and locked, wheelchairs and assistive devices out of sight, use non-slip footwear, adequate lighting, clutter free and spill free environment, educate to call for assistance     Fall Risk Level Interventions:    TRIAL (TELE 8, NEURO, MED MARIMAR 5) Moderate Fall Risk Interventions  Place yellow fall risk ID band on patient: verified  Provide patient/family education based on risk assessment : completed  Educate patient/family to call staff for assistance when getting out of bed: completed  Place fall precaution signage outside patient door: verified  Utilize bed/chair fall alarm: verifiedTRIAL (TELE 8, NEURO, [employer] MARIMAR 5) High Fall Risk Interventions  Place yellow fall risk ID band on patient: verified  Provide patient/family education based on risk assessment: completed  Educate patient/family to call staff for assistance when getting out of bed: completed  Place fall precaution signage outside patient door: verified  Place patient in room close to nursing station: verified  Utilize bed/chair fall alarm: verified  Notify charge of high risk for huddle: completed     Patient Specific Interventions:      Medication: review medications with patient and family  Mental Status/LOC/Awareness: reorient patient, check on patient hourly, utilize bed/chair fall alarm and reinforce the use of call light  Toileting: instruct patient/family on the need to call for assistance when  toileting  Volume/Electrolyte Status: ensure patient remains hydrated  Communication/Sensory: update plan of care on whiteboard  Behavioral: instruct/reinforce fall program rationale  Mobility: utilize bed/chair fall alarm and ensure bed is locked and in lowest position

## 2017-09-10 NOTE — PROGRESS NOTES
Received report from [facility] nurse. Assessment complete. Patient is A&Ox3, reoriented to date, denies pain at this time, Gonzalez in place to gravity drainage, no family present, PIV patent and SL. Pt educated on POC and verbalized understanding. Patient is resting now. Call light within reach, bed in lowest position, treaded socks in place, and bed alarm on.

## 2017-09-10 NOTE — CARE PLAN
Problem: Safety  Goal: Will remain free from injury  Bed locked and in lowest position, call light and personal belongings within reach, pt educated to call for assistance. Bed alarm on, room near nursing station. Hourly rounding in effect.       Problem: Skin Integrity  Goal: Risk for impaired skin integrity will decrease  Patient with moisture fissure/wound to sacrum, mepilex changed. Q2 turns implemented. Heels floated on pillow.

## 2017-09-10 NOTE — CARE PLAN
Problem: Discharge Barriers/Planning  Goal: Patient's continuum of care needs will be met    Intervention: Collaborate with Transitional Care Team and Interdisciplinary Team to meet discharge needs  Pt family requesting [facility] services, noted in chart for SW to consider.      Problem: Pain Management  Goal: Pain level will decrease to patient's comfort goal    Intervention: Follow pain managment plan developed in collaboration with patient and Interdisciplinary Team  Pt repositioned and pain medication administered prn.

## 2017-09-10 NOTE — PROGRESS NOTES
Assumed patient care at 1900. POC discussed w/ day nurse and pt, pt in agreement w/ goals. Comfort measures in place. Pt AOx3, reoriented to time. Pt denies pain or nausea. Q2 turns implemented, mepilex changed to sacrum. Pillows in place for positioning. Patient educated on use of call light, hourly rounding, and pain scale. Personal possession and call light within reach. Bed alarm on.

## 2017-09-10 NOTE — PROGRESS NOTES
Savita Bo Fall Risk Assessment:     Last Known Fall: Within the last month  Mobility: Use of assistive device/requires assist of two people  Medications: No meds  Mental Status/LOC/Awareness: Oriented to person and place  Toileting Needs: Use of catheters or diversion devices  Volume/Electrolyte Status: No problems  Communication/Sensory: Visual (Glasses)/hearing deficit  Behavior: Appropriate behavior  Savita Bo Fall Risk Total: 12  Fall Risk Level: MODERATE RISK    Universal Fall Precautions:  call light/belongings in reach, bed in low position and locked, wheelchairs and assistive devices out of sight, use non-slip footwear, adequate lighting, clutter free and spill free environment, educate to call for assistance    Fall Risk Level Interventions:    TRIAL (TELE 8, NEURO, MED MARIMAR 5) Moderate Fall Risk Interventions  Place yellow fall risk ID band on patient: verified  Provide patient/family education based on risk assessment : completed  Educate patient/family to call staff for assistance when getting out of bed: completed  Place fall precaution signage outside patient door: verified  Utilize bed/chair fall alarm: verifiedTRIAL (TELE 8, NEURO, [employer] MARIMAR 5) High Fall Risk Interventions  Place yellow fall risk ID band on patient: verified  Provide patient/family education based on risk assessment: completed  Educate patient/family to call staff for assistance when getting out of bed: completed  Place fall precaution signage outside patient door: verified  Place patient in room close to nursing station: verified  Utilize bed/chair fall alarm: verified  Notify charge of high risk for huddle: completed    Patient Specific Interventions:     Medication: review medications with patient and family  Mental Status/LOC/Awareness: reorient patient, check on patient hourly, utilize bed/chair fall alarm and reinforce the use of call light  Toileting: instruct patient/family on the need to call for assistance when  toileting  Volume/Electrolyte Status: ensure patient remains hydrated  Communication/Sensory: update plan of care on whiteboard  Behavioral: instruct/reinforce fall program rationale  Mobility: utilize bed/chair fall alarm and ensure bed is locked and in lowest position

## 2017-09-11 PROCEDURE — 770006 HCHG ROOM/CARE - MED/SURG/GYN SEMI*

## 2017-09-11 PROCEDURE — 99232 SBSQ HOSP IP/OBS MODERATE 35: CPT | Performed by: INTERNAL MEDICINE

## 2017-09-11 ASSESSMENT — ENCOUNTER SYMPTOMS
MYALGIAS: 0
COUGH: 0
NECK PAIN: 0
DIZZINESS: 0
HEARTBURN: 0
BLURRED VISION: 0

## 2017-09-11 ASSESSMENT — PAIN SCALES - GENERAL
PAINLEVEL_OUTOF10: 0

## 2017-09-11 NOTE — DISCHARGE PLANNING
This pt has VA benefits and family would like to pursue hospice care with the [facility]. Pt is connected with [facility]. CM there is Leidy Delgadillok- [phone]    MIKE placed call to Leidy regarding coordination of care for this pt. MIKE l/m requesting call back.

## 2017-09-11 NOTE — PROGRESS NOTES
Received report from [facility] nurse. Assessment complete. Patient is A&Ox3, reoriented to date, denies pain at this time, Gonzalez in place to gravity drainage. Pt educated on POC and verbalized understanding. Patient is resting now. Call light within reach, bed in lowest position, treaded socks in place, and bed alarm on.

## 2017-09-11 NOTE — DISCHARGE PLANNING
MIKE is working with Leidy Van at [facility] to coordinate this pt's VA benefits. Pt is enrolled with the [facility] and will need to be enrolled at [facility] for further help with hospice care.  Victoria at the [facility] has been assigned to this case.   [phone]    Fax: [phone]    Victoria has requested H&P, physician notes, PT/OT, and hospice evaluation for this pt.   MIKE spoke with [facility] RN Yessenia who is able to provide these documents to the [facility].

## 2017-09-11 NOTE — CARE PLAN
Problem: Safety  Goal: Will remain free from injury  Bed locked and in lowest position, call light and personal belongings within reach, pt educated to call for assistance. Bed alarm on. Hourly rounding in effect.       Problem: Pain Management  Goal: Pain level will decrease to patient's comfort goal  Patient denies pain and appears to be comfortable. Comfort measures in place. Repositioned for comfort.

## 2017-09-11 NOTE — PROGRESS NOTES
Assumed patient care at 1900. POC discussed w/ day nurse and pt, pt in agreement w/ goals. Comfort measures in place. Pt AOx3, reoriented to event. Pt denies pain or nausea. Gonzalez in place for comfort. Patient educated on use of call light, hourly rounding, and pain scale. Personal possession and call light within reach. Bed alarm on.

## 2017-09-11 NOTE — DISCHARGE PLANNING
MIKE staffed this case with Yessenia from [facility].     Plan: KAREN for [facility] or  placement. [facility] is able to help financially if needed. MIKE will explore these options with pt. Pt has indicated he would like to be on hospice care outside of the hospital.

## 2017-09-11 NOTE — PALLIATIVE CARE
Palliative Care follow-up  Collaborative conversation with MIKE Mcdonough regarding patient possible placement vs continued comfort care at [facility].        Updated: MIKE Mcdonough    Plan: MIKE and Hospice collaborating regarding ongoing care plan    Thank you for allowing Palliative Care to participate in this patient's care. Please feel free to call [phone] with any questions or concerns.

## 2017-09-11 NOTE — PROGRESS NOTES
[facility] Hospitalist Progress Note    Date of Service: 2017    Chief Complaint  89 y.o. male admitted 2017 with ESRD, now on comfort care    Interval Problem Update  Sleepy but arousable; denies pain/discomfort    Consultants/Specialty  hospice    Disposition  Group home vs snf with hospice        Review of Systems   Eyes: Negative for blurred vision.   Respiratory: Negative for cough.    Cardiovascular: Negative for chest pain.   Gastrointestinal: Negative for heartburn.   Genitourinary: Negative for dysuria.   Musculoskeletal: Negative for myalgias and neck pain.   Neurological: Negative for dizziness.      Physical Exam  Laboratory/Imaging   Hemodynamics  Temp (24hrs), Av.2 °C (98.9 °F), Min:37.2 °C (98.9 °F), Max:37.2 °C (98.9 °F)   Temperature: 37.2 °C (98.9 °F)  Pulse  Av.9  Min: 55  Max: 96    Blood Pressure : 131/61      Respiratory      Respiration: 17, Pulse Oximetry: 92 %     Work Of Breathing / Effort: Mild  RUL Breath Sounds: Clear, RML Breath Sounds: Diminished, RLL Breath Sounds: Diminished, INGRIS Breath Sounds: Clear, LLL Breath Sounds: Diminished    Fluids    Intake/Output Summary (Last 24 hours) at 17 1114  Last data filed at 09/10/17 1800   Gross per 24 hour   Intake                0 ml   Output             1200 ml   Net            -1200 ml       Nutrition  Orders Placed This Encounter   Procedures   • DIET ORDER     Standing Status:   Standing     Number of Occurrences:   1     Order Specific Question:   Diet:     Answer:   Regular [1]     Physical Exam   Constitutional: No distress.   HENT:   Head: Normocephalic.   Eyes: EOM are normal.   Neck: Neck supple.   Cardiovascular: Normal rate and regular rhythm.    Pulmonary/Chest: Effort normal and breath sounds normal.   Abdominal: Soft. Bowel sounds are normal. He exhibits no distension. There is no tenderness.   Musculoskeletal: He exhibits no edema.   Neurological: He is alert.   Skin: Skin is warm.                                 Assessment/Plan     ESRD, now on comfort care; no distress/pain at this time; cont current rx.  SW working on possible transfer to lower level, SNF or group home with hospice.  Quality-Core Measures

## 2017-09-11 NOTE — PROGRESS NOTES
Savita Bo Fall Risk Assessment:     Last Known Fall: Within the last month  Mobility: Use of assistive device/requires assist of two people  Medications: No meds  Mental Status/LOC/Awareness: Oriented to person and place  Toileting Needs: Use of catheters or diversion devices  Volume/Electrolyte Status: No problems  Communication/Sensory: Visual (Glasses)/hearing deficit  Behavior: Appropriate behavior  Savita Bo Fall Risk Total: 12  Fall Risk Level: MODERATE RISK     Universal Fall Precautions:  bed in low position and locked, call light/belongings in reach, siderails up x 2, use non-slip footwear, educate to call for assistance     Fall Risk Level Interventions:    TRIAL (TELE 8, NEURO, MED MARIMAR 5) Moderate Fall Risk Interventions  Place yellow fall risk ID band on patient: verified  Provide patient/family education based on risk assessment : completed  Educate patient/family to call staff for assistance when getting out of bed: completed  Place fall precaution signage outside patient door: verified  Utilize bed/chair fall alarm: verifiedTRIAL (TELE 8, NEURO, [employer] MARIMAR 5) High Fall Risk Interventions  Place yellow fall risk ID band on patient: verified  Provide patient/family education based on risk assessment: completed  Educate patient/family to call staff for assistance when getting out of bed: completed  Place fall precaution signage outside patient door: verified  Place patient in room close to nursing station: verified  Utilize bed/chair fall alarm: verified  Notify charge of high risk for huddle: completed     Patient Specific Interventions:      Medication: review medications with patient and family  Mental Status/LOC/Awareness: check on patient hourly and utilize bed/chair fall alarm  Toileting: not applicable  Volume/Electrolyte Status: ensure patient remains hydrated  Communication/Sensory: update plan of care on whiteboard  Behavioral: instruct/reinforce fall program rationale  Mobility:  utilize bed/chair fall alarm and ensure bed is locked and in lowest position

## 2017-09-11 NOTE — PROGRESS NOTES
Savita Bo Fall Risk Assessment:     Last Known Fall: Within the last month  Mobility: Use of assistive device/requires assist of two people  Medications: No meds  Mental Status/LOC/Awareness: Oriented to person and place  Toileting Needs: Use of catheters or diversion devices  Volume/Electrolyte Status: No problems  Communication/Sensory: Visual (Glasses)/hearing deficit  Behavior: Appropriate behavior  Savita Bo Fall Risk Total: 12  Fall Risk Level: MODERATE RISK    Universal Fall Precautions:  bed in low position and locked, call light/belongings in reach, siderails up x 2, use non-slip footwear, educate to call for assistance    Fall Risk Level Interventions:    TRIAL (TELE 8, NEURO, MED MARIMAR 5) Moderate Fall Risk Interventions  Place yellow fall risk ID band on patient: verified  Provide patient/family education based on risk assessment : completed  Educate patient/family to call staff for assistance when getting out of bed: completed  Place fall precaution signage outside patient door: verified  Utilize bed/chair fall alarm: verifiedTRIAL (TELE 8, NEURO, [employer] MARIMAR 5) High Fall Risk Interventions  Place yellow fall risk ID band on patient: verified  Provide patient/family education based on risk assessment: completed  Educate patient/family to call staff for assistance when getting out of bed: completed  Place fall precaution signage outside patient door: verified  Place patient in room close to nursing station: verified  Utilize bed/chair fall alarm: verified  Notify charge of high risk for huddle: completed    Patient Specific Interventions:     Medication: review medications with patient and family  Mental Status/LOC/Awareness: check on patient hourly and utilize bed/chair fall alarm  Toileting: not applicable  Volume/Electrolyte Status: ensure patient remains hydrated  Communication/Sensory: update plan of care on whiteboard  Behavioral: instruct/reinforce fall program rationale  Mobility: utilize  bed/chair fall alarm and ensure bed is locked and in lowest position

## 2017-09-11 NOTE — CARE PLAN
Problem: Communication  Goal: The ability to communicate needs accurately and effectively will improve    Intervention: [place] patient and significant other/support system to call light to alert staff of needs  Pt reoriented frequently, able to communicate needs.      Problem: Pain Management  Goal: Pain level will decrease to patient's comfort goal    Intervention: Educate and implement non-pharmacologic comfort measures. Examples: relaxation, distration, play therapy, activity therapy, massage, etc.  Pt repositioned for comfort, pain medications provided as needed.

## 2017-09-12 PROCEDURE — 700102 HCHG RX REV CODE 250 W/ 637 OVERRIDE(OP): Performed by: INTERNAL MEDICINE

## 2017-09-12 PROCEDURE — 770006 HCHG ROOM/CARE - MED/SURG/GYN SEMI*

## 2017-09-12 PROCEDURE — A9270 NON-COVERED ITEM OR SERVICE: HCPCS | Performed by: INTERNAL MEDICINE

## 2017-09-12 PROCEDURE — 99233 SBSQ HOSP IP/OBS HIGH 50: CPT | Performed by: INTERNAL MEDICINE

## 2017-09-12 RX ADMIN — MORPHINE SULFATE 10 MG: 100 SOLUTION ORAL at 16:27

## 2017-09-12 RX ADMIN — MORPHINE SULFATE 10 MG: 100 SOLUTION ORAL at 21:40

## 2017-09-12 ASSESSMENT — PAIN SCALES - GENERAL
PAINLEVEL_OUTOF10: 0
PAINLEVEL_OUTOF10: 6
PAINLEVEL_OUTOF10: 0
PAINLEVEL_OUTOF10: 2
PAINLEVEL_OUTOF10: 5
PAINLEVEL_OUTOF10: 0

## 2017-09-12 NOTE — PROGRESS NOTES
Savita Bo Fall Risk Assessment:     Last Known Fall: Within the last month  Mobility: Use of assistive device/requires assist of two people  Medications: No meds  Mental Status/LOC/Awareness: Oriented to person and place  Toileting Needs: Use of catheters or diversion devices  Volume/Electrolyte Status: No problems  Communication/Sensory: Visual (Glasses)/hearing deficit  Behavior: Appropriate behavior  Savita Bo Fall Risk Total: 12  Fall Risk Level: MODERATE RISK     [place] Fall Precautions:  bed in low position and locked, call light/belongings in reach, siderails up x 2, use non-slip footwear, educate to call for assistance     Fall Risk Level Interventions:    TRIAL (TELE 8, NEURO, MED MARIMAR 5) Moderate Fall Risk Interventions  Place yellow fall risk ID band on patient: verified  Provide patient/family education based on risk assessment : completed  Educate patient/family to call staff for assistance when getting out of bed: completed  Place fall precaution signage outside patient door: verified  Utilize bed/chair fall alarm: verifiedTRIAL (TELE 8, NEURO, [employer] MARIMAR 5) High Fall Risk Interventions  Place yellow fall risk ID band on patient: verified  Provide patient/family education based on risk assessment: completed  Educate patient/family to call staff for assistance when getting out of bed: completed  Place fall precaution signage outside patient door: verified  Place patient in room close to nursing station: verified  Utilize bed/chair fall alarm: verified  Notify charge of high risk for huddle: completed     Patient Specific Interventions:      Medication: review medications with patient and family  Mental Status/LOC/Awareness: check on patient hourly and utilize bed/chair fall alarm  Toileting: not applicable  Volume/Electrolyte Status: ensure patient remains hydrated  Communication/Sensory: update plan of care on whiteboard  Behavioral: instruct/reinforce fall program rationale  Mobility:  utilize bed/chair fall alarm and ensure bed is locked and in lowest position

## 2017-09-12 NOTE — PROGRESS NOTES
[facility] Hospitalist Progress Note    Date of Service: 9/12/2017    Chief Complaint  89/M with HTN, transferred from [facility] for continued creatinine elevation (crea 7.29) despite IVF, causing respiratory failure and worsening of chronic cough, responding to diuretics. (+) unstageable sacral ulcer. BNP high in the 2000s. Crea 7.39. Trop elevated at 0.23, felt to be due to ALBERTO. Has been retaining urine there and saavedra placed. CXR showed multifocal consolidation. Started on antibiotics for pneumonia. Started on lasix. Nephrology consulted, gave opinion of poor prognosis as no close dialysis center near home. Crea worsened to 8.01. Na, K WNL. BNP remains high at 2805. UA showed  RBC, 30 protein. PCT high at 0.67. Renal US showed increased echogenecity B/L c/w medical renal disease, with no hydronephrosis. 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 Chest CT showed multifocal pneumonia, with reactive mediastinal adenopathy, and moderate B/L pleural effusions. Nephrology felt this was CKD. Diuresed well with IV lasix. Failed swallow eval, cortrak placed, started on TF. Patient opted for comfort care.       Interval Problem Update  9/12/2017 - no overnight events. Remains hemodynamically stable and afebrile. Stable on RA.  CM/SW working on hospice arrangements[facility] .     > Seen and examined. Comfortable. Denies any pain. No nausea, vomiting, constipation, abd pain.           Consultants/Specialty  Nephrology  Palliative Care    Disposition  Monitor on the medical floors. Discharge planning. ?[facility] transfer      ROS     Pertinent positives/negatives as mentioned above.      A complete review of systems was done. All other systems were negative.      Physical Exam  Laboratory/Imaging   Hemodynamics  No data recorded.      Pulse  Av.9  Min: 55  Max: 96           Respiratory            Work Of Breathing / Effort: Mild  RUL Breath Sounds: Clear, RML Breath Sounds: Diminished, RLL Breath Sounds: Diminished, INGRIS Breath Sounds: Clear, LLL Breath Sounds: Diminished    Fluids    Intake/Output Summary (Last 24 hours) at 17 0731  Last data filed at 17 0000   Gross per 24 hour   Intake                0 ml   Output             1200 ml   Net            -1200 ml       Nutrition  Orders Placed This Encounter   Procedures   • DIET ORDER     Standing Status:   Standing     Number of Occurrences:   1     Order Specific Question:   Diet:     Answer:   Regular [1]     Physical Exam   Constitutional: He is oriented to person, place, and time. He appears well-developed.   Weak looking   HENT:   Head: Normocephalic and atraumatic.   Mouth/Throat: Oropharynx is clear and moist. No oropharyngeal exudate.   Eyes: EOM are normal. Pupils are equal, round, and reactive to light. Right eye exhibits no discharge. Left eye exhibits no discharge. No scleral icterus.   Neck: Normal range of motion. Neck supple. No thyromegaly present.   Cardiovascular: Normal rate and regular rhythm.  Exam reveals no gallop and no friction rub.    No murmur heard.  Pulmonary/Chest: Effort normal and breath sounds normal. He has no wheezes. He has no rales. He exhibits no tenderness.   Abdominal: Soft. Bowel sounds are normal. There is no tenderness. There is no rebound and no guarding.   Musculoskeletal: Normal range of motion. He exhibits edema (trace B/L Le). He exhibits no tenderness.   Lymphadenopathy:     He has no cervical adenopathy.   Neurological: He is alert and oriented to person, place, and time. No cranial nerve deficit. Coordination normal.   Skin: Skin is warm and dry. No rash noted. No erythema.    Psychiatric: He has a normal mood and affect. His behavior is normal. Thought content normal.   Vitals reviewed.                               Assessment/Plan     Active Problems:    Acute renal failure (ARF) (CMS-HCC) POA: Yes    Elevated brain natriuretic peptide (BNP) level POA: Yes    Troponin level elevated POA: Yes    Pneumonia, community acquired, possible aspiration pneumonia POA: Yes    Hypocalcemia, corrected to low albumin POA: Yes    Urinary retention POA: Yes    Anemia POA: Yes    Unstageable pressure ulcer of sacral region (CMS-HCC) POA: Yes    Dysphagia POA: Yes    Anasarca due to renal failure, hypoalbuminemia POA: Yes    Hypertension POA: Yes    Severe protein-calorie malnutrition, hypoalbuminemia (CMS-HCC) POA: Yes    Renal failure POA: Unknown  Resolved Problems:    * No resolved hospital problems. *      - remains comfortable with current comfort measures. Continue PRN robinul, roxanol, IV morphine, bowel regimen, eye drops. No further aggressive care, diagnostics etc.   - CM/SW working on discharge planning/[facility] to [facility]osp[facility].       Reviewed items::  Labs reviewed, Radiology images reviewed and Medications reviewed  Gonzalez catheter::  No Gonzalez  DVT: Not indicated, comfort care.  Ulcer Prophylaxis::  Not indicated

## 2017-09-12 NOTE — DISCHARGE PLANNING
Victoria at [facility] requested the following:  Nephrology consult  Last 72 hours of pt's chart    This info was faxed to Victoria at [phone].

## 2017-09-12 NOTE — CARE PLAN
Problem: Discharge Barriers/Planning  Goal: Patient's continuum of care needs will be met    Intervention: Collaborate with Transitional Care Team and Interdisciplinary Team to meet discharge needs  Working with CM to establish hospice outside of hospital.

## 2017-09-12 NOTE — PROGRESS NOTES
Received report from day shift RN. Assumed care of patient. Pt shows no s/sx of distress. Discussed plan of care for day with patient and received verbal understanding. Pt is on comfort care at this time. Call light within reach, strip alarm active, bed in low position.

## 2017-09-12 NOTE — PROGRESS NOTES
Received report from [facility] nurse. Assessment complete. Patient is A&O to self and place, reoriented to time and situation, denies pain, Gonzalez draining to gravity, comfort care measures in place. Pt educated on POC and verbalized understanding. Patient is resting now. Call light within reach, bed in lowest position, treaded socks in place, and bed alarm on.

## 2017-09-12 NOTE — PROGRESS NOTES
Savita Bo Fall Risk Assessment:     Last Known Fall: Within the last month  Mobility: Use of assistive device/requires assist of two people  Medications: No meds  Mental Status/LOC/Awareness: Oriented to person and place  Toileting Needs: Use of catheters or diversion devices  Volume/Electrolyte Status: No problems  Communication/Sensory: Visual (Glasses)/hearing deficit  Behavior: Appropriate behavior  Savita Bo Fall Risk Total: 12  Fall Risk Level: MODERATE RISK     [place] Fall Precautions:  bed in low position and locked, call light/belongings in reach, siderails up x 2, use non-slip footwear, educate to call for assistance     Fall Risk Level Interventions:    TRIAL (TELE 8, NEURO, MED MARIMAR 5) Moderate Fall Risk Interventions  Place yellow fall risk ID band on patient: verified  Provide patient/family education based on risk assessment : completed  Educate patient/family to call staff for assistance when getting out of bed: completed  Place fall precaution signage outside patient door: verified  Utilize bed/chair fall alarm: verifiedTRIAL (TELE 8, NEURO, [employer] MARIMAR 5) High Fall Risk Interventions  Place yellow fall risk ID band on patient: verified  Provide patient/family education based on risk assessment: completed  Educate patient/family to call staff for assistance when getting out of bed: completed  Place fall precaution signage outside patient door: verified  Place patient in room close to nursing station: verified  Utilize bed/chair fall alarm: verified  Notify charge of high risk for huddle: completed     Patient Specific Interventions:      Medication: review medications with patient and family  Mental Status/LOC/Awareness: check on patient hourly and utilize bed/chair fall alarm  Toileting: not applicable  Volume/Electrolyte Status: ensure patient remains hydrated  Communication/Sensory: update plan of care on whiteboard  Behavioral: instruct/reinforce fall program rationale  Mobility:  utilize bed/chair fall alarm and ensure bed is locked and in lowest position

## 2017-09-12 NOTE — CARE PLAN
Problem: Knowledge Deficit  Goal: Knowledge of disease process/condition, treatment plan, diagnostic tests, and medications will improve    Intervention: Explain information regarding disease process/condition, treatment plan, diagnostic tests, and medications and document in education  POC reviewed with pt and pt verbalized understanding.      Problem: Pain Management  Goal: Pain level will decrease to patient's comfort goal    Intervention: Follow pain managment plan developed in collaboration with patient and Interdisciplinary Team  Pt repositioned often and prn pain medications provided for comfort.

## 2017-09-13 PROCEDURE — 770006 HCHG ROOM/CARE - MED/SURG/GYN SEMI*

## 2017-09-13 PROCEDURE — A9270 NON-COVERED ITEM OR SERVICE: HCPCS | Performed by: INTERNAL MEDICINE

## 2017-09-13 PROCEDURE — 700102 HCHG RX REV CODE 250 W/ 637 OVERRIDE(OP): Performed by: INTERNAL MEDICINE

## 2017-09-13 PROCEDURE — 99232 SBSQ HOSP IP/OBS MODERATE 35: CPT | Performed by: INTERNAL MEDICINE

## 2017-09-13 RX ORDER — GLYCOPYRROLATE 1 MG/1
1 TABLET ORAL 3 TIMES DAILY PRN
Qty: 90 TAB
Start: 2017-09-13

## 2017-09-13 RX ORDER — AMOXICILLIN 250 MG
2 CAPSULE ORAL 2 TIMES DAILY
Qty: 30 TAB | Refills: 0
Start: 2017-09-13

## 2017-09-13 RX ORDER — MORPHINE SULFATE 100 MG/5ML
10 SOLUTION ORAL
Qty: 30 ML | Refills: 0
Start: 2017-09-13

## 2017-09-13 RX ORDER — ONDANSETRON 4 MG/1
4 TABLET, ORALLY DISINTEGRATING ORAL EVERY 4 HOURS PRN
Qty: 10 TAB | Refills: 0
Start: 2017-09-13

## 2017-09-13 RX ORDER — POLYETHYLENE GLYCOL 3350 17 G/17G
17 POWDER, FOR SOLUTION ORAL
Refills: 3
Start: 2017-09-13

## 2017-09-13 RX ORDER — POLYVINYL ALCOHOL 14 MG/ML
2 SOLUTION/ DROPS OPHTHALMIC EVERY 6 HOURS PRN
Qty: 1 BOTTLE | Refills: 3
Start: 2017-09-13

## 2017-09-13 RX ORDER — BISACODYL 10 MG
10 SUPPOSITORY, RECTAL RECTAL
Refills: 0
Start: 2017-09-13

## 2017-09-13 RX ADMIN — MORPHINE SULFATE 10 MG: 100 SOLUTION ORAL at 19:45

## 2017-09-13 ASSESSMENT — PAIN SCALES - GENERAL
PAINLEVEL_OUTOF10: 0
PAINLEVEL_OUTOF10: 2
PAINLEVEL_OUTOF10: 5
PAINLEVEL_OUTOF10: 0
PAINLEVEL_OUTOF10: 0

## 2017-09-13 NOTE — PROGRESS NOTES
Received bedside report from day RN and assumed care of patient at 1900. Patient is alert and oriented x2 with no signs of acute distress. Pt calmly resting in bed; jazz music played per request. Safety precautions in place including patient call light within reach, bed alarm on, personal possessions nearby, bed in low position and locked, hourly rounding in practice, and non-skid socks in place. Comfort care measures in place.

## 2017-09-13 NOTE — PROGRESS NOTES
Note to confirm competency on the date the patient completed POLST dated 9/4/17.I spoke with both Dr Flores and Imer Nava RN who confirmed that on 9/4/17 the patient was fully capacitatedto make decisions regarding ACP and GOC. Although the patient is occasionally confused and sleepy he remains oriented to person and place. Today the patient agrees that he would like to be transferred to the [facility]. I believe he is adequately capacitated to make this decision.

## 2017-09-13 NOTE — PROGRESS NOTES
Patient requests the holy sacrament of the Last Rights to be administered by a . On Call  paged in order to elicit more information on available spiritual care.     Patient does not belong to a local [place].     Will follow up.

## 2017-09-13 NOTE — PROGRESS NOTES
Savita Bo Fall Risk Assessment:     Last Known Fall: Within the last six months  Mobility: Use of assistive device/requires assist of two people  Medications: Cardiovascular or central nervous system meds  Mental Status/LOC/Awareness: Oriented to person and place  Toileting Needs: Use of catheters or diversion devices  Volume/Electrolyte Status: No problems  Communication/Sensory: Visual (Glasses)/hearing deficit  Behavior: Appropriate behavior  Savita Bo Fall Risk Total: 12  Fall Risk Level: MODERATE RISK    Universal Fall Precautions:  call light/belongings in reach, bed in low position and locked, educate to call for assistance    Fall Risk Level Interventions:    TRIAL (MobileHelp 8, NEURO, MED MARIMAR 5) Moderate Fall Risk Interventions  Place yellow fall risk ID band on patient: verified  Provide patient/family education based on risk assessment : verified  Educate patient/family to call staff for assistance when getting out of bed: verified  Place fall precaution signage outside patient door: verified  Utilize bed/chair fall alarm: verifiedTRIAL (TELE 8, NEURO, MED MARIMAR 5) High Fall Risk Interventions  Place yellow fall risk ID band on patient: verified  Provide patient/family education based on risk assessment: completed  Educate patient/family to call staff for assistance when getting out of bed: completed  Place fall precaution signage outside patient door: verified  Place patient in room close to nursing station: verified  Utilize bed/chair fall alarm: verified  Notify charge of high risk for huddle: completed    Patient Specific Interventions:     Medication: review medications with patient and family  Mental Status/LOC/Awareness: check on patient hourly  Toileting: provide frquent toileting  Volume/Electrolyte Status: ensure patient remains hydrated  Communication/Sensory: update plan of care on whiteboard  Behavioral: administer medication as ordered  Mobility: utilize bed/chair fall alarm and ensure  bed is locked and in lowest position

## 2017-09-13 NOTE — DISCHARGE PLANNING
MIKE spoke with [facility] indicated they have been working with Dr. Zapata to determine their ability to accept this pt. [facility] 9/14 as long as Dr. Zapata is able to provide an MD note stating pt's ability to consent/ next of kin decision making.     SW will f/u this afternoon to ensure d/c tomorrow morning.

## 2017-09-13 NOTE — DISCHARGE SUMMARY
HOSPITAL MEDICINE DISCHARGE SUMMARY    Name: Aravind Camejo  MRN: [id number]  : 11/3/1925  Admit Date: 2017  Discharge Date: 2017  Attending Provider: Ernesto Flores M.D.  Admitting Provider: Haja Coreas M.D.  Primary Care Physician: No primary care provider on file.    DISCHARGE DIAGNOSES:   Active Problems:    Acute renal failure (ARF) (CMS-HCC) POA: Yes    Elevated brain natriuretic peptide (BNP) level POA: Yes    Troponin level elevated POA: Yes    Pneumonia, community acquired, possible aspiration pneumonia POA: Yes    Hypocalcemia, corrected to low albumin POA: Yes    Urinary retention POA: Yes    Anemia POA: Yes    Unstageable pressure ulcer of sacral region (CMS-HCC) POA: Yes    Dysphagia POA: Yes    Anasarca due to renal failure, hypoalbuminemia POA: Yes    Hypertension POA: Yes    Severe protein-calorie malnutrition, hypoalbuminemia (CMS-HCC) POA: Yes    Renal failure POA: Unknown  Resolved Problems:    * No resolved hospital problems. *        SUMMARY OF EVENTS LEADING TO ADMISSION:   89/M with HTN, transferred from [facility] for continued creatinine elevation (crea 7.29) despite IVF, causing respiratory failure and worsening of chronic cough, responding to diuretics. (+) unstageable sacral ulcer. BNP high in the s. Crea 7.39. Trop elevated at 0.23, felt to be due to ALBERTO. Has been retaining urine there and saavedra placed.     For further details of history of present illness, past medical/social/family histories, allergies and medication, please refer to copy of admission note by Dr. Haja Coreas M.D. on 17.     HOSPITAL COURSE:   The patient was admitted to the hospitalist service. CXR on admission showed multifocal consolidation. His procalcitonin was also high. Chest CT showed multifocal pneumonia, with reactive mediastinal adenopathy, and moderate B/L pleural effusions.He was started on antibiotics for pneumonia, along with IV lasix. Nephrology was  consulted, who gave the opinion of poor prognosis as no close dialysis center near home along with his advanced age. His creatinine continued to worsen.  Renal US showed increased echogenecity B/L consistent with medical renal disease, with no hydronephrosis. 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  Nephrology felt this was CKD. He diuresed well initially with IV lasix, although his renal function continued to worsen. He also failed swallow eval, and cortrak placed. His respiratory status, in addition to his renal function continued to deteriorate, and after discussing with the patient about his prognosis, he opted for comfort care and hospice. He was deemed to be fully capacitated to make decisions. CM/SW worked on hospice arrangements, and he was subsequently accepted by the [facility] for continued comfort measures.      DISCHARGE DISPOSITION: comfort care on hospice.     FOLLOW-UP ISSUES:   - he will be continued on comfort care/hospice at the [facility]. Continue PRN roxanol, robinul, antiemetics, and bowel regimen. Further comfort measures as deemed appropriate at the receiving facility.     CHANGES IN MANAGEMENT OF CHRONIC CONDITION: As above.     PENDING TESTS: none    FOLLOW-UP TESTS ORDERED POST DISCHARGE: none    DISCHARGE MEDICATIONS:   Medication Sig Start Date End Date   artificial tears 1.4 % Solution Place 2 Drops in both eyes every 6 hours as needed (Dry eyes   ). 9/13/17    glycopyrrolate (ROBINUL) 1 MG Tab Take 1 Tab by mouth 3 times a day as needed (for oral secretions). 9/13/17    morphine (ROXANOL) 20 MG/ML Solution Take 0.5 mL by mouth  every 1 hour as needed. 9/13/17    ondansetron (ZOFRAN ODT) 4 MG TABLET DISPERSIBLE Take 1 Tab by mouth every four hours as needed for Nausea/Vomiting (give PO if IV route is unavailable. May give per feeding tube.). 9/13/17    senna-docusate (PERICOLACE OR SENOKOT S) 8.6-50 MG Tab Take 2 Tabs by mouth 2 Times a Day. 9/13/17    polyethylene glycol/lytes (MIRALAX) Pack Take 1 Packet by mouth 1 time daily as needed (if sennosides and docusate ineffective after 24 hours). 9/13/17    bisacodyl (DULCOLAX) 10 MG Suppos Insert 1 Suppository in rectum 1 time daily as needed (if magnesium hydroxide ineffective after 24 hours). 9/13/17        DIET:   Orders Placed This Encounter   Procedures   • DIET ORDER     Standing Status:   Standing     Number of Occurrences:   1     Order Specific Question:   Diet:     Answer:   Regular [1]        CODE STATUS: Comfort Care/DNR    FOLLOW-UP APPOINTMENTS:   N/A.     No follow-up provider specified.      For further details on discharge medications, patient education, diet, and activity, please refer to electronic copy of discharge instructions.       TIME SPENT: 45 minutes, with greater than 50% of the time spent on face-to-face encounter, addressing medical issues, coordination of care, counseling, discharge planning, medication reconciliation, and documentation.

## 2017-09-13 NOTE — DISCHARGE PLANNING
[facility]    MIKE spoke with Dr. Flores.  plans to dictate d/c summary in the AM prior to transport at 900.    MIKE completed [facility] and transport communication. Pt and Brennon (POA)  updated.     The following documents should be faxed prior to transport:  DC Summary  POLST    Also, a nurse to nurse will need to occur.  [facility][phone]

## 2017-09-13 NOTE — PROGRESS NOTES
[facility] Hospitalist Progress Note    Date of Service: 9/13/2017    Chief Complaint  89/M with HTN, transferred from [facility] for continued creatinine elevation (crea 7.29) despite IVF, causing respiratory failure and worsening of chronic cough, responding to diuretics. (+) unstageable sacral ulcer. BNP high in the 2000s. Crea 7.39. Trop elevated at 0.23, felt to be due to ALBERTO. Has been retaining urine there and saavedra placed. CXR showed multifocal consolidation. Started on antibiotics for pneumonia. Started on lasix. Nephrology consulted, gave opinion of poor prognosis as no close dialysis center near home. Crea worsened to 8.01. Na, K WNL. BNP remains high at 2805. UA showed  RBC, 30 protein. PCT high at 0.67. Renal US showed increased echogenecity B/L c/w medical renal disease, with no hydronephrosis. 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 Chest CT showed multifocal pneumonia, with reactive mediastinal adenopathy, and moderate B/L pleural effusions. Nephrology felt this was CKD. Diuresed well with IV lasix. Failed swallow eval, cortrak placed, started on TF. Patient opted for comfort care. CM/SW working on hosp[facility] arrangements[facility]       Interval Problem Update  9/13/2017 - uneventful night. VSS. Afebrile. Saturating well on RA.     > Seen and examined. States he's comfortable. Denied any pain, nausea, vomiting. (+) BMs.           Consultants/Specialty  Nephrology  Palliative Care    Disposition  Monitor on the medical floors. Discharge planning. ?[facility]      ROS     Pertinent positives/negatives as mentioned above.     A complete review of  systems was done. All other systems were negative.      Physical Exam  Laboratory/Imaging   Hemodynamics  No data recorded.      Pulse  Av.9  Min: 55  Max: 96           Respiratory            Work Of Breathing / Effort: Mild  RUL Breath Sounds: Diminished, RML Breath Sounds: Diminished, RLL Breath Sounds: Diminished, INGRIS Breath Sounds: Diminished, LLL Breath Sounds: Diminished    Fluids    Intake/Output Summary (Last 24 hours) at 17 0709  Last data filed at 17 2119   Gross per 24 hour   Intake              236 ml   Output              800 ml   Net             -564 ml       Nutrition  Orders Placed This Encounter   Procedures   • DIET ORDER     Standing Status:   Standing     Number of Occurrences:   1     Order Specific Question:   Diet:     Answer:   Regular [1]     Physical Exam   Constitutional: He appears well-developed.   HENT:   Head: Normocephalic and atraumatic.   Mouth/Throat: Oropharynx is clear and moist. No oropharyngeal exudate.   Eyes: EOM are normal. Pupils are equal, round, and reactive to light. Right eye exhibits no discharge. Left eye exhibits no discharge. No scleral icterus.   Neck: Normal range of motion. Neck supple. No thyromegaly present.   Cardiovascular: Normal rate and regular rhythm.  Exam reveals no gallop and no friction rub.    No murmur heard.  Pulmonary/Chest: Effort normal and breath sounds normal. He has no wheezes. He has no rales. He exhibits no tenderness.   Abdominal: Soft. Bowel sounds are normal. There is no tenderness. There is no rebound and no guarding.   Musculoskeletal: Normal range of motion. He exhibits edema (trace LE bilateral). He exhibits no tenderness.   Lymphadenopathy:     He has no cervical adenopathy.   Neurological: He is alert. Coordination normal.   Answers questions appropriately   Skin: Skin is warm and dry. No rash noted. No erythema.   Psychiatric: He has a normal mood and affect. His behavior is normal. Thought content normal.    Vitals reviewed.                               Assessment/Plan     Active Problems:    Acute renal failure (ARF) (CMS-HCC) POA: Yes    Elevated brain natriuretic peptide (BNP) level POA: Yes    Troponin level elevated POA: Yes    Pneumonia, community acquired, possible aspiration pneumonia POA: Yes    Hypocalcemia, corrected to low albumin POA: Yes    Urinary retention POA: Yes    Anemia POA: Yes    Unstageable pressure ulcer of sacral region (CMS-HCC) POA: Yes    Dysphagia POA: Yes    Anasarca due to renal failure, hypoalbuminemia POA: Yes    Hypertension POA: Yes    Severe protein-calorie malnutrition, hypoalbuminemia (CMS-HCC) POA: Yes    Renal failure POA: Unknown  Resolved Problems:    * No resolved hospital problems. *      - continue comfort care/comfort measures.  Continue PRN roxanol for pain, IV robinul for secretions, bowel regimen, antiemetics.   - await discharge disposition, await [facility] if [facility] for continued comfort care/hospice. CM/SW on-board.    Reviewed items::  Radiology images reviewed, Medications reviewed and Labs reviewed  Gonzalez catheter::  No Gonzalez  DVT: Not indicated, comfort care.  Ulcer Prophylaxis::  Not indicated

## 2017-09-13 NOTE — CARE PLAN
Problem: Pain Management  Goal: Pain level will decrease to patient's comfort goal  Outcome: PROGRESSING AS EXPECTED  Comfort Care medications available for patient and administered as indicated. Patient resting quietly in bed at this time.

## 2017-09-13 NOTE — PROGRESS NOTES
Report received from NOC RN. Patient resting comfortably in bed. Will promote optimal comfort therapeutic resting environment throughout shift.    All needs met at this time. Will continue to monitor.

## 2017-09-13 NOTE — PROGRESS NOTES
Savita Bo Fall Risk Assessment:     Last Known Fall: Within the last six months  Mobility: Use of assistive device/requires assist of two people  Medications: Cardiovascular or central nervous system meds  Mental Status/LOC/Awareness: Oriented to person and place  Toileting Needs: Use of catheters or diversion devices  Volume/Electrolyte Status: No problems  Communication/Sensory: Visual (Glasses)/hearing deficit  Behavior: Appropriate behavior  Savita Bo Fall Risk Total: 12  Fall Risk Level: MODERATE RISK    Universal Fall Precautions:  call light/belongings in reach, bed in low position and locked, wheelchairs and assistive devices out of sight, use non-slip footwear, siderails up x 2, adequate lighting, educate on level of risk, clutter free and spill free environment, educate to call for assistance    Fall Risk Level Interventions:    TRIAL ([employer], NEURO, MED MARIMAR 5) Moderate Fall Risk Interventions  Place yellow fall risk ID band on patient: verified  Provide patient/family education based on risk assessment : verified  Educate patient/family to call staff for assistance when getting out of bed: verified  Place fall precaution signage outside patient door: verified  Utilize bed/chair fall alarm: verifiedTRIAL ([employer] 8, NEURO, MED MARIMAR 5) High Fall Risk Interventions  Place yellow fall risk ID band on patient: verified  Provide patient/family education based on risk assessment: completed  Educate patient/family to call staff for assistance when getting out of bed: completed  Place fall precaution signage outside patient door: verified  Place patient in room close to nursing station: verified  Utilize bed/chair fall alarm: verified  Notify charge of high risk for huddle: completed    Patient Specific Interventions:     Medication: review medications with patient and family  Mental Status/LOC/Awareness: reorient patient, reinforce falls education, check on patient hourly, utilize bed/chair fall alarm and  reinforce the use of call light  Toileting: provide frquent toileting  Volume/Electrolyte Status: ensure patient remains hydrated  Communication/Sensory: update plan of care on whiteboard  Behavioral: encourage patient to voice feelings and provide emotional support for comfort care   Mobility: utilize bed/chair fall alarm and ensure bed is locked and in lowest position

## 2017-09-14 VITALS
OXYGEN SATURATION: 85 % | HEART RATE: 85 BPM | TEMPERATURE: 97 F | WEIGHT: 173.28 LBS | DIASTOLIC BLOOD PRESSURE: 52 MMHG | RESPIRATION RATE: 19 BRPM | HEIGHT: 72 IN | BODY MASS INDEX: 23.47 KG/M2 | SYSTOLIC BLOOD PRESSURE: 83 MMHG

## 2017-09-14 PROCEDURE — 99239 HOSP IP/OBS DSCHRG MGMT >30: CPT | Performed by: INTERNAL MEDICINE

## 2017-09-14 ASSESSMENT — PAIN SCALES - GENERAL
PAINLEVEL_OUTOF10: 3
PAINLEVEL_OUTOF10: 0

## 2017-09-14 ASSESSMENT — LIFESTYLE VARIABLES: EVER_SMOKED: NEVER

## 2017-09-14 NOTE — DISCHARGE PLANNING
Call from Raphael with [employer], patient only has Medicare part A with no transportation coverage. [facility] transport will have to be covered by approved services.   CCS placed call to MIKE Mcdonough, who states that patient has VA coverage.  Received VA insurance information from MIKE Mcdonough and has been sent to [facility].    Call placed to [facility], spoke to Raphael and he states that VA insurance will cover transportation for patient.

## 2017-09-14 NOTE — PROGRESS NOTES
Savita Bo Fall Risk Assessment:     Last Known Fall: Within the last six months  Mobility: Use of assistive device/requires assist of two people  Medications: Cardiovascular or central nervous system meds  Mental Status/LOC/Awareness: Oriented to person and place  Toileting Needs: Use of catheters or diversion devices  Volume/Electrolyte Status: No problems  Communication/Sensory: Visual (Glasses)/hearing deficit  Behavior: Appropriate behavior  Savita Bo Fall Risk Total: 12  Fall Risk Level: MODERATE RISK     Universal Fall Precautions:  call light/belongings in reach, bed in low position and locked, wheelchairs and assistive devices out of sight, use non-slip footwear, siderails up x 2, adequate lighting, educate on level of risk, clutter free and spill free environment, educate to call for assistance     Fall Risk Level Interventions:    TRIAL ([employer], NEURO, MED MARIMAR 5) Moderate Fall Risk Interventions  Place yellow fall risk ID band on patient: verified  Provide patient/family education based on risk assessment : verified  Educate patient/family to call staff for assistance when getting out of bed: verified  Place fall precaution signage outside patient door: verified  Utilize bed/chair fall alarm: verifiedTRIAL ([employer] 8, NEURO, MED MARIMAR 5) High Fall Risk Interventions  Place yellow fall risk ID band on patient: verified  Provide patient/family education based on risk assessment: completed  Educate patient/family to call staff for assistance when getting out of bed: completed  Place fall precaution signage outside patient door: verified  Place patient in room close to nursing station: verified  Utilize bed/chair fall alarm: verified  Notify charge of high risk for huddle: completed     Patient Specific Interventions:      Medication: review medications with patient and family  Mental Status/LOC/Awareness: reorient patient, reinforce falls education, check on patient hourly, utilize bed/chair fall alarm  and reinforce the use of call light  Toileting: provide frquent toileting  Volume/Electrolyte Status: ensure patient remains hydrated  Communication/Sensory: update plan of care on whiteboard  Behavioral: encourage patient to voice feelings and provide emotional support for comfort care   Mobility: utilize bed/chair fall alarm and ensure bed is locked and in lowest position

## 2017-09-14 NOTE — PROGRESS NOTES
Received bedside report from day RN and assumed care of patient at 1900. Patient is alert and oriented xSELF with no signs of acute distress. Medicated per MAR for pain. Safety precautions in place including patient call light within reach, bed alarm on, personal possessions nearby, bed in low position and locked, hourly rounding in practice, and non-skid socks in place. Comfort care measures in place.

## 2017-09-14 NOTE — CARE PLAN
"Problem: Safety  Goal: Will remain free from injury  Outcome: PROGRESSING SLOWER THAN EXPECTED  Pt was pocking food in mouth during dinner. Pt instructed to chew food 5x and swallow. Pt not following instructions and not swallow. RN instructed pt to spit food out; pt not following instructions. Food debris removed by RN d/t risk for aspiration.    Problem: Pain Management  Goal: Pain level will decrease to patient's comfort goal  Outcome: PROGRESSING AS EXPECTED  Pt only needed pain medication x1 this shift. Pt has been resting calmly in bed and states \"no pain\".       "

## 2017-09-14 NOTE — DISCHARGE PLANNING
SW faxed this pt's requested D/C summary, POLST and facesheet to [facility] at [facility] ([phone]).   Bedside RN Rocio updated and Nurse to nurse requested.     Pt and ANNELIESE Engle updated with transfer time.     COBRA and packet completed and placed in pt chart along with DNR script.

## 2017-09-14 NOTE — DISCHARGE PLANNING
Received transportation request from MIKE Mcdonough for patient to transfer to [facility] via [facility]. Info sent and call placed to [facility], spoke to Jude. Transport time arranged for 9/14 at 0900.     MIKE Mcdonough has been notified via voicemail.

## 2017-09-14 NOTE — DISCHARGE INSTRUCTIONS
Discharge Instructions    Discharged to other by medical transportation with escort. Discharged via ambulance, hospital escort: Yes.  Special equipment needed: Not Applicable    Be sure to schedule a follow-up appointment with your primary care doctor or any specialists as instructed.     Discharge Plan:   Diet Plan: Discussed  Activity Level: Discussed  Confirmed Follow up Appointment: No (Comments)  Confirmed Symptoms Management: Discussed  Medication Reconciliation Updated: Yes  Influenza Vaccine Indication: Not indicated: Previously immunized this influenza season and > 8 years of age  Influenza Vaccine Given - only chart on this line when given: Influenza Vaccine Given (See MAR)    I understand that a diet low in cholesterol, fat, and sodium is recommended for good health. Unless I have been given specific instructions below for another diet, I accept this instruction as my diet prescription.   Other diet: Regular    Special Instructions: None    · Is patient discharged on Warfarin / Coumadin?   No     · Is patient Post Blood Transfusion?  No    Depression / Suicide Risk    As [facility] are [facility], it is important to learn how to keep safe from harming yourself.    Recognize the warning signs:  · Abrupt changes in personality, positive or negative- including increase in energy   · Giving away possessions  · Change in eating patterns- significant weight changes-  positive or negative  · Change in sleeping patterns- unable to sleep or sleeping all the time   · Unwillingness or inability to communicate  · Depression  · Unusual sadness, discouragement and loneliness  · Talk of wanting to die  · Neglect of personal appearance   · Rebelliousness- reckless behavior  · Withdrawal from people/activities they love  · Confusion- inability to concentrate     If you or a loved one observes any of these behaviors or has concerns about self-harm, here's what you can do:  · Talk about it- your  feelings and reasons for harming yourself  · Remove any means that you might use to hurt yourself (examples: pills, rope, extension cords, firearm)  · Get professional help from the community (Mental Health, Substance Abuse, psychological counseling)  · Do not be alone:Call your Safe Contact- someone whom you trust who will be there for you.  · Call your local CRISIS HOTLINE 889-6493 or 980-925-4708  · Call your local Children's Mobile Crisis Response Team [place] (625) 862-5805 or www.[employer]  · Call the toll free National Suicide Prevention Hotlines   · National Suicide Prevention Lifeline 693-348-OWKG (7448)  · National Hope Line Network 800-SUICIDE (376-2143)
